# Patient Record
Sex: MALE | Race: WHITE | NOT HISPANIC OR LATINO | URBAN - METROPOLITAN AREA
[De-identification: names, ages, dates, MRNs, and addresses within clinical notes are randomized per-mention and may not be internally consistent; named-entity substitution may affect disease eponyms.]

---

## 2018-02-23 ENCOUNTER — EMERGENCY (EMERGENCY)
Facility: HOSPITAL | Age: 38
LOS: 0 days | Discharge: HOME | End: 2018-02-23
Attending: EMERGENCY MEDICINE

## 2018-02-23 VITALS — RESPIRATION RATE: 18 BRPM | HEART RATE: 95 BPM | OXYGEN SATURATION: 97 %

## 2018-02-23 VITALS
TEMPERATURE: 100 F | OXYGEN SATURATION: 98 % | HEART RATE: 109 BPM | DIASTOLIC BLOOD PRESSURE: 82 MMHG | RESPIRATION RATE: 22 BRPM | SYSTOLIC BLOOD PRESSURE: 139 MMHG

## 2018-02-23 DIAGNOSIS — J40 BRONCHITIS, NOT SPECIFIED AS ACUTE OR CHRONIC: ICD-10-CM

## 2018-02-23 DIAGNOSIS — J45.909 UNSPECIFIED ASTHMA, UNCOMPLICATED: ICD-10-CM

## 2018-02-23 DIAGNOSIS — J44.9 CHRONIC OBSTRUCTIVE PULMONARY DISEASE, UNSPECIFIED: ICD-10-CM

## 2018-02-23 DIAGNOSIS — Z79.899 OTHER LONG TERM (CURRENT) DRUG THERAPY: ICD-10-CM

## 2018-02-23 DIAGNOSIS — R05 COUGH: ICD-10-CM

## 2018-02-23 DIAGNOSIS — F17.200 NICOTINE DEPENDENCE, UNSPECIFIED, UNCOMPLICATED: ICD-10-CM

## 2018-02-23 RX ORDER — IPRATROPIUM/ALBUTEROL SULFATE 18-103MCG
3 AEROSOL WITH ADAPTER (GRAM) INHALATION ONCE
Qty: 0 | Refills: 0 | Status: COMPLETED | OUTPATIENT
Start: 2018-02-23 | End: 2018-02-23

## 2018-02-23 RX ORDER — AZITHROMYCIN 500 MG/1
1 TABLET, FILM COATED ORAL
Qty: 6 | Refills: 0
Start: 2018-02-23 | End: 2018-02-27

## 2018-02-23 RX ADMIN — Medication 3 MILLILITER(S): at 04:06

## 2018-02-23 RX ADMIN — Medication 50 MILLIGRAM(S): at 04:05

## 2018-02-23 NOTE — ED ADULT TRIAGE NOTE - CHIEF COMPLAINT QUOTE
cough, congestion w/ shortness of breath x 2-3 days. (+) rib pain cough, congestion w/ x 2-3 days. (+) rib pain

## 2018-02-23 NOTE — ED PROVIDER NOTE - MEDICAL DECISION MAKING DETAILS
pt sx c/w bronchitis; ekg reviewed, cxr negative; abx, prednisone, and cough meds given; pt to see pulmonologist next week

## 2018-02-23 NOTE — ED PROVIDER NOTE - NS ED ROS FT
Constitutional: (-) fever  Cardiovascular: (-) syncope  Respiratory: see hpi  Integumentary: (-) rash  Musculoskeltal: (-) myalgias  Neurological: (-) altered mental status

## 2018-02-23 NOTE — ED PROVIDER NOTE - PHYSICAL EXAMINATION
Vital Signs: I have reviewed the initial vital signs.  Constitutional: well-nourished, appears stated age, no acute distress  Heent: Perrl; eomi; tm clear; pharynx: mild erythema no exudates  Cardiovascular: regular rate, regular rhythm, well-perfused extremities  Respiratory: unlabored respiratory effort, + mild expiratory wheeze; no focal consolidation  Gastrointestinal: soft, non-tender abdomen, no pulsatile mass  Psychiatric: appropriate mood, appropriate affect

## 2018-02-23 NOTE — ED PROVIDER NOTE - ATTENDING CONTRIBUTION TO CARE
pt co congestion, productive cough, wheezing. pt in nad, speaking full sent no accessory use, b/l ro nchi and wheezing, moving air well, rrr, ab soft, nt, no edema/leg tenderness. will get cxr, nebs, steroids, abx.

## 2018-02-23 NOTE — ED ADULT NURSE NOTE - OBJECTIVE STATEMENT
patient states he has had cough for three days, now causing him discomfort in ribs. alert and in no respiratory distress, speaking in full sentences. denies any SOB

## 2018-02-23 NOTE — ED PROVIDER NOTE - OBJECTIVE STATEMENT
37 yold male to ED Pmhx Copd/asthma c/o cough, congestion, and cp with coughing forcefully x 2-3 days; pt currently under care of pulmonary DR. Calhoun currently on symbicort and proair; pt denies fever, sore throat, n/v, diaphoresis; pt started smoking recently again; + flu vaccination

## 2018-10-24 NOTE — ED ADULT NURSE NOTE - FALL HARM RISK TYPE OF ASSESSMENT
Admission
I will SWITCH the dose or number of times a day I take the medications listed below when I get home from the hospital:  None

## 2019-04-16 PROBLEM — J44.9 CHRONIC OBSTRUCTIVE PULMONARY DISEASE, UNSPECIFIED: Chronic | Status: ACTIVE | Noted: 2018-02-23

## 2019-04-16 PROBLEM — Z00.00 ENCOUNTER FOR PREVENTIVE HEALTH EXAMINATION: Status: ACTIVE | Noted: 2019-04-16

## 2019-04-16 PROBLEM — F17.200 NICOTINE DEPENDENCE, UNSPECIFIED, UNCOMPLICATED: Chronic | Status: ACTIVE | Noted: 2018-02-23

## 2019-04-22 ENCOUNTER — APPOINTMENT (OUTPATIENT)
Dept: NEUROSURGERY | Facility: CLINIC | Age: 39
End: 2019-04-22
Payer: COMMERCIAL

## 2019-04-22 VITALS — BODY MASS INDEX: 27.09 KG/M2 | HEIGHT: 72 IN | WEIGHT: 200 LBS

## 2019-04-22 DIAGNOSIS — Z86.39 PERSONAL HISTORY OF OTHER ENDOCRINE, NUTRITIONAL AND METABOLIC DISEASE: ICD-10-CM

## 2019-04-22 DIAGNOSIS — M47.816 SPONDYLOSIS W/OUT MYELOPATHY OR RADICULOPATHY, LUMBAR REGION: ICD-10-CM

## 2019-04-22 DIAGNOSIS — F17.200 NICOTINE DEPENDENCE, UNSPECIFIED, UNCOMPLICATED: ICD-10-CM

## 2019-04-22 PROCEDURE — 99203 OFFICE O/P NEW LOW 30 MIN: CPT

## 2019-04-22 RX ORDER — IBUPROFEN 600 MG/1
600 TABLET, FILM COATED ORAL TWICE DAILY
Qty: 60 | Refills: 0 | Status: ACTIVE | COMMUNITY
Start: 2019-04-22 | End: 1900-01-01

## 2019-04-22 RX ORDER — METHOCARBAMOL 750 MG/1
750 TABLET, FILM COATED ORAL EVERY 8 HOURS
Qty: 90 | Refills: 0 | Status: ACTIVE | COMMUNITY
Start: 2019-04-22 | End: 1900-01-01

## 2019-04-23 PROBLEM — F17.200 CURRENT EVERY DAY SMOKER: Status: ACTIVE | Noted: 2019-04-23

## 2019-04-23 PROBLEM — F17.200 CURRENT EVERY DAY SMOKER: Status: ACTIVE | Noted: 2019-04-22

## 2019-04-23 PROBLEM — Z86.39 HISTORY OF THYROID DISORDER: Status: RESOLVED | Noted: 2019-04-23 | Resolved: 2019-04-23

## 2019-04-23 PROBLEM — M47.816 LUMBAR SPONDYLOSIS: Status: ACTIVE | Noted: 2019-04-23

## 2019-04-24 NOTE — ASSESSMENT
[FreeTextEntry1] : We have had a thorough discussion regarding his findings. He is aware I would like an MRI of the lumbar spine for further evaluation. I will see him back once the MRI is completed. He is agreeable with our plan.

## 2019-04-24 NOTE — HISTORY OF PRESENT ILLNESS
[de-identified] : Mr. Vernon presents today for evaluation of severe lower back pain. Of late he notes intermittent pain in the right leg, mainly the thigh. He has trialed LESIs with Dr. Richardson which provided relief in the past. He had PT in the past, however, it aggravated his symptoms. Recently, he presents to the ER due to the severity of his pain. A CT scan of the lumbar spine from 4/16/19 showed mild spondylosis. As per the report there is a bulge at L4/5. His back pain worsens with sitting, walking, and bending. He is taking Ibuprofen and Robaxin as needed. On exam today he is alert and oriented. No distress. Gait steady. Motor exam is 5/5. Sensory intact. He has decreased ROM of the lumbar spine. Reflexes are equal.

## 2019-05-20 ENCOUNTER — APPOINTMENT (OUTPATIENT)
Dept: NEUROSURGERY | Facility: CLINIC | Age: 39
End: 2019-05-20

## 2019-11-05 ENCOUNTER — TRANSCRIPTION ENCOUNTER (OUTPATIENT)
Age: 39
End: 2019-11-05

## 2020-08-30 ENCOUNTER — EMERGENCY (EMERGENCY)
Facility: HOSPITAL | Age: 40
LOS: 0 days | Discharge: HOME | End: 2020-08-30
Attending: EMERGENCY MEDICINE | Admitting: EMERGENCY MEDICINE
Payer: COMMERCIAL

## 2020-08-30 VITALS
OXYGEN SATURATION: 99 % | DIASTOLIC BLOOD PRESSURE: 73 MMHG | TEMPERATURE: 98 F | HEART RATE: 73 BPM | SYSTOLIC BLOOD PRESSURE: 105 MMHG | RESPIRATION RATE: 18 BRPM

## 2020-08-30 VITALS
SYSTOLIC BLOOD PRESSURE: 138 MMHG | DIASTOLIC BLOOD PRESSURE: 83 MMHG | OXYGEN SATURATION: 99 % | TEMPERATURE: 98 F | RESPIRATION RATE: 18 BRPM | HEART RATE: 68 BPM

## 2020-08-30 DIAGNOSIS — F17.200 NICOTINE DEPENDENCE, UNSPECIFIED, UNCOMPLICATED: ICD-10-CM

## 2020-08-30 DIAGNOSIS — Z90.49 ACQUIRED ABSENCE OF OTHER SPECIFIED PARTS OF DIGESTIVE TRACT: ICD-10-CM

## 2020-08-30 DIAGNOSIS — K52.9 NONINFECTIVE GASTROENTERITIS AND COLITIS, UNSPECIFIED: ICD-10-CM

## 2020-08-30 DIAGNOSIS — K92.1 MELENA: ICD-10-CM

## 2020-08-30 DIAGNOSIS — J44.9 CHRONIC OBSTRUCTIVE PULMONARY DISEASE, UNSPECIFIED: ICD-10-CM

## 2020-08-30 DIAGNOSIS — E03.9 HYPOTHYROIDISM, UNSPECIFIED: ICD-10-CM

## 2020-08-30 LAB
ALBUMIN SERPL ELPH-MCNC: 4.9 G/DL — SIGNIFICANT CHANGE UP (ref 3.5–5.2)
ALP SERPL-CCNC: 72 U/L — SIGNIFICANT CHANGE UP (ref 30–115)
ALT FLD-CCNC: 24 U/L — SIGNIFICANT CHANGE UP (ref 0–41)
ANION GAP SERPL CALC-SCNC: 11 MMOL/L — SIGNIFICANT CHANGE UP (ref 7–14)
APTT BLD: 32.7 SEC — SIGNIFICANT CHANGE UP (ref 27–39.2)
AST SERPL-CCNC: 20 U/L — SIGNIFICANT CHANGE UP (ref 0–41)
BASOPHILS # BLD AUTO: 0.04 K/UL — SIGNIFICANT CHANGE UP (ref 0–0.2)
BASOPHILS NFR BLD AUTO: 0.6 % — SIGNIFICANT CHANGE UP (ref 0–1)
BILIRUB DIRECT SERPL-MCNC: <0.2 MG/DL — SIGNIFICANT CHANGE UP (ref 0–0.2)
BILIRUB INDIRECT FLD-MCNC: >0.2 MG/DL — SIGNIFICANT CHANGE UP (ref 0.2–1.2)
BILIRUB SERPL-MCNC: 0.4 MG/DL — SIGNIFICANT CHANGE UP (ref 0.2–1.2)
BLD GP AB SCN SERPL QL: SIGNIFICANT CHANGE UP
BUN SERPL-MCNC: 12 MG/DL — SIGNIFICANT CHANGE UP (ref 10–20)
CALCIUM SERPL-MCNC: 9.3 MG/DL — SIGNIFICANT CHANGE UP (ref 8.5–10.1)
CHLORIDE SERPL-SCNC: 105 MMOL/L — SIGNIFICANT CHANGE UP (ref 98–110)
CO2 SERPL-SCNC: 25 MMOL/L — SIGNIFICANT CHANGE UP (ref 17–32)
CREAT SERPL-MCNC: 1 MG/DL — SIGNIFICANT CHANGE UP (ref 0.7–1.5)
EOSINOPHIL # BLD AUTO: 0.22 K/UL — SIGNIFICANT CHANGE UP (ref 0–0.7)
EOSINOPHIL NFR BLD AUTO: 3.5 % — SIGNIFICANT CHANGE UP (ref 0–8)
GLUCOSE SERPL-MCNC: 91 MG/DL — SIGNIFICANT CHANGE UP (ref 70–99)
HCT VFR BLD CALC: 42.7 % — SIGNIFICANT CHANGE UP (ref 42–52)
HGB BLD-MCNC: 15 G/DL — SIGNIFICANT CHANGE UP (ref 14–18)
IMM GRANULOCYTES NFR BLD AUTO: 0.5 % — HIGH (ref 0.1–0.3)
INR BLD: 1.01 RATIO — SIGNIFICANT CHANGE UP (ref 0.65–1.3)
LACTATE SERPL-SCNC: 1.1 MMOL/L — SIGNIFICANT CHANGE UP (ref 0.7–2)
LIDOCAIN IGE QN: 17 U/L — SIGNIFICANT CHANGE UP (ref 7–60)
LYMPHOCYTES # BLD AUTO: 1.63 K/UL — SIGNIFICANT CHANGE UP (ref 1.2–3.4)
LYMPHOCYTES # BLD AUTO: 25.7 % — SIGNIFICANT CHANGE UP (ref 20.5–51.1)
MCHC RBC-ENTMCNC: 31.3 PG — HIGH (ref 27–31)
MCHC RBC-ENTMCNC: 35.1 G/DL — SIGNIFICANT CHANGE UP (ref 32–37)
MCV RBC AUTO: 89.1 FL — SIGNIFICANT CHANGE UP (ref 80–94)
MONOCYTES # BLD AUTO: 0.47 K/UL — SIGNIFICANT CHANGE UP (ref 0.1–0.6)
MONOCYTES NFR BLD AUTO: 7.4 % — SIGNIFICANT CHANGE UP (ref 1.7–9.3)
NEUTROPHILS # BLD AUTO: 3.95 K/UL — SIGNIFICANT CHANGE UP (ref 1.4–6.5)
NEUTROPHILS NFR BLD AUTO: 62.3 % — SIGNIFICANT CHANGE UP (ref 42.2–75.2)
NRBC # BLD: 0 /100 WBCS — SIGNIFICANT CHANGE UP (ref 0–0)
PLATELET # BLD AUTO: 164 K/UL — SIGNIFICANT CHANGE UP (ref 130–400)
POTASSIUM SERPL-MCNC: 4.2 MMOL/L — SIGNIFICANT CHANGE UP (ref 3.5–5)
POTASSIUM SERPL-SCNC: 4.2 MMOL/L — SIGNIFICANT CHANGE UP (ref 3.5–5)
PROT SERPL-MCNC: 7.2 G/DL — SIGNIFICANT CHANGE UP (ref 6–8)
PROTHROM AB SERPL-ACNC: 11.6 SEC — SIGNIFICANT CHANGE UP (ref 9.95–12.87)
RBC # BLD: 4.79 M/UL — SIGNIFICANT CHANGE UP (ref 4.7–6.1)
RBC # FLD: 12.8 % — SIGNIFICANT CHANGE UP (ref 11.5–14.5)
SODIUM SERPL-SCNC: 141 MMOL/L — SIGNIFICANT CHANGE UP (ref 135–146)
TROPONIN T SERPL-MCNC: <0.01 NG/ML — SIGNIFICANT CHANGE UP
WBC # BLD: 6.34 K/UL — SIGNIFICANT CHANGE UP (ref 4.8–10.8)
WBC # FLD AUTO: 6.34 K/UL — SIGNIFICANT CHANGE UP (ref 4.8–10.8)

## 2020-08-30 PROCEDURE — 71046 X-RAY EXAM CHEST 2 VIEWS: CPT | Mod: 26

## 2020-08-30 PROCEDURE — 99285 EMERGENCY DEPT VISIT HI MDM: CPT

## 2020-08-30 PROCEDURE — 74177 CT ABD & PELVIS W/CONTRAST: CPT | Mod: 26

## 2020-08-30 PROCEDURE — 93010 ELECTROCARDIOGRAM REPORT: CPT

## 2020-08-30 RX ORDER — METRONIDAZOLE 500 MG
1 TABLET ORAL
Qty: 30 | Refills: 0
Start: 2020-08-30 | End: 2020-09-08

## 2020-08-30 RX ORDER — MOXIFLOXACIN HYDROCHLORIDE TABLETS, 400 MG 400 MG/1
1 TABLET, FILM COATED ORAL
Qty: 20 | Refills: 0
Start: 2020-08-30 | End: 2020-09-08

## 2020-08-30 RX ORDER — SODIUM CHLORIDE 9 MG/ML
1000 INJECTION, SOLUTION INTRAVENOUS ONCE
Refills: 0 | Status: COMPLETED | OUTPATIENT
Start: 2020-08-30 | End: 2020-08-30

## 2020-08-30 RX ADMIN — SODIUM CHLORIDE 1000 MILLILITER(S): 9 INJECTION, SOLUTION INTRAVENOUS at 13:51

## 2020-08-30 NOTE — ED PROVIDER NOTE - PROVIDER TOKENS
FREE:[LAST:[YOUR GASTROENTEROLIGST],PHONE:[(   )    -],FAX:[(   )    -],ADDRESS:[YOUR GASTROENTEROLOGIST   DR. NASCIMENTO]]

## 2020-08-30 NOTE — ED ADULT TRIAGE NOTE - CHIEF COMPLAINT QUOTE
pt presenting with abdominal pain and bloody stools x 2 days.  pt reports he has had intermittent abd pain and bloody stool for 6 months total but the last two days has had worsening symptoms.

## 2020-08-30 NOTE — ED PROVIDER NOTE - CARE PROVIDER_API CALL
YOUR GASTROENTEROLIGST,   YOUR GASTROENTEROLOGIST   DR. NASCIMENTO  Phone: (   )    -  Fax: (   )    -  Follow Up Time:

## 2020-08-30 NOTE — ED PROVIDER NOTE - CLINICAL SUMMARY MEDICAL DECISION MAKING FREE TEXT BOX
pt presented with bloody stools, abd soft, no sig bleeding here. ct showing Descending colon wall thickening with surrounding inflammatory change consistent with colitis of uncertain etiology. will tx with cipro flagyl but aware MUST f/u with GI for further eval and colonscopy

## 2020-08-30 NOTE — ED PROVIDER NOTE - NSFOLLOWUPINSTRUCTIONS_ED_ALL_ED_FT
Colitis    Colitis is inflammation of the colon. Colitis may last a short time (acute) or it may last a long time (chronic).     CAUSES  This condition may be caused by:    Viruses.  Bacteria.  Reactions to medicine.  Certain autoimmune diseases, such as Crohn disease or ulcerative colitis.    SYMPTOMS  Symptoms of this condition include:    Diarrhea.  Passing bloody or tarry stool.  Pain.  Fever.  Vomiting.  Tiredness (fatigue).  Weight loss.  Bloating.  Sudden increase in abdominal pain.  Having fewer bowel movements than usual.    DIAGNOSIS  This condition is diagnosed with a stool test or a blood test. You may also have other tests, including X-rays, a CT scan, or a colonoscopy.    TREATMENT  Treatment may include:    Resting the bowel. This involves not eating or drinking for a period of time.  Fluids that are given through an IV tube.  Medicine for pain and diarrhea.  Antibiotic medicines.  Surgery.    HOME CARE INSTRUCTIONS  Eating and Drinking    Follow instructions from your health care provider about eating or drinking restrictions.  Drink enough fluid to keep your urine clear or pale yellow.  Work with a dietitian to determine which foods cause your condition to flare up.  Avoid foods that cause flare-ups.  Eat a well-balanced diet.    Medicines    Take over-the-counter and prescription medicines only as told by your health care provider.  If you were prescribed an antibiotic medicine, take it as told by your health care provider. Do not stop taking the antibiotic even if you start to feel better.    General Instructions    Keep all follow-up visits as told by your health care provider. This is important.    SEEK MEDICAL CARE IF:  Your symptoms do not go away.  You develop new symptoms.    SEEK IMMEDIATE MEDICAL CARE IF:  You have a fever that does not go away with treatment.  You develop chills.  You have extreme weakness, fainting, or dehydration.  You have repeated vomiting.  You develop severe pain in your abdomen.  You pass bloody or tarry stool.    ADDITIONAL NOTES AND INSTRUCTIONS    Please follow up with your Primary MD in 24-48 hr.  Seek immediate medical care for any new/worsening signs or symptoms.

## 2020-08-30 NOTE — ED PROVIDER NOTE - PHYSICAL EXAMINATION
Physical Exam    Vital Signs: I have reviewed the initial vital signs.  Constitutional: well-nourished, appears stated age, no acute distress  Eyes: Conjunctiva pink, Sclera clear  Cardiovascular: S1 and S2, regular rate, regular rhythm, well-perfused extremities, radial pulses equal and 2+ b/l.   Respiratory: unlabored respiratory effort, clear to auscultation bilaterally no wheezing, rales and rhonchi. pt is speaking full sentences. no accessory muscle use.   Gastrointestinal: soft, non-tender, nondistended abdomen, no pulsatile mass, normal bowl sounds, no rebound, no guarding, negative psoas, negative obturator, negative murphys. no cva tenderness.   Genitourinary: rectal exam negative for hemorrhoids of anal fissures. no palpable masses. no bright right blood per rectum. no stool obtained during rectal exam. however pt brought as stool sample, and there is gross blood in the stool.   Musculoskeletal: supple neck, no lower extremity edema, no calf tenderness  Neurologic: awake, alert  Psychiatric: appropriate mood, appropriate affect

## 2020-08-30 NOTE — ED PROVIDER NOTE - PROGRESS NOTE DETAILS
discussed radiology and lab results with pt. discussed dx of colitis. rx abx. advised to f/u with gi. advised of return precautions such as fever, chills, syncope, abdominal pain, increase bloody stool. agreeable to dc.

## 2020-08-30 NOTE — ED PROVIDER NOTE - PATIENT PORTAL LINK FT
You can access the FollowMyHealth Patient Portal offered by [facility] by registering at the following website: [email or web address]. By joining [employer]’s FollowMyHealth portal, you will also be able to view your health information using other applications (apps) compatible with our system.

## 2020-08-30 NOTE — ED PROVIDER NOTE - OBJECTIVE STATEMENT
39 y/o male with a PMH of reactive airway disease (follows dr. boykin), hypothyroidism, and glaucoma presents to the ED for evaluation of burning epigastric pain radiating to the chest, and blood in the stool x 2 days. pt reports having about 10-12 episodes of bloody stools the past two days. pt reports he has had intermittent bloody stools about 1 x a week for six months. Pt was seen by dr. boss last year for abdominal pain, endoscopy and colonscopy inconclusive. Pt reports eating spicy foods daily. pt reports vaping. pt has a hx of appendectomy. pt denies use of NSAIDS, n/v/d/c, blood in the urine, easy bruising, sob, back pain, arm pain, neck pain, jaw pain, fever chills, fhx of UC or crohns, dizziness, recent travel, new foods in the diet, recent vaccinations, recent trauma, recent surgeries, use of hormones or steroids, recent sick contacts, hx of mi or blood clot, rashes, or headaches.

## 2020-08-30 NOTE — ED PROVIDER NOTE - NS ED ROS FT
CONST: No fever, chills or bodyaches  EYES: No pain, redness, drainage or visual changes.  ENT: No ear pain or discharge, nasal discharge or congestion. No sore throat  CARD: No chest pain, palpitations  RESP: No SOB, cough, hemoptysis. No hx of asthma or COPD  GI: (+) epigastric abdominal pain. No N/V/D  : (+) blood in the stool. No urinary symptoms  MS: No joint pain, back pain or extremity pain/injury  SKIN: No rashes  NEURO: No headache, dizziness, paresthesias or LOC

## 2020-08-30 NOTE — ED PROVIDER NOTE - ATTENDING CONTRIBUTION TO CARE
41 y/o male with a PMH of reactive airway disease (follows dr. boykin), hypothyroidism, and glaucoma here for eval of of burning epigastric pain radiating to the chest, and blood in the stool x 2 days. pt reports having about 10-12 episodes of bloody stools the past two days. blood is bright red, mixed with stool and only when he goes to the ab[facility]room. pt has in the past had  intermittent bloody stools about 1 x a week for six months. Pt was seen by dr. boss last year for abdominal pain, endoscopy and colonscopy inconclusive. Pt reports eating spicy foods daily. pt denies use of NSAIDS, n/v.   CON: ao x 3, HENMT: clear oropharynx,  neck supple,  CV: rrr, equal pulses b/l, RESP: cta b/l, GI:  soft, nontender, no rebound, no guarding, SKIN: no rash, MSK: no deformities, NEURO: no gross motor or sensory deficit Psychiatric: appropriate mood, appropriate affect MARY as per PA unremarkable

## 2020-08-30 NOTE — ED ADULT NURSE NOTE - OBJECTIVE STATEMENT
Pt with C/O abdomen pain with blood in the stool for 2 days with blood in the stool and spot ing  blood  for 2 month ,

## 2021-03-25 ENCOUNTER — EMERGENCY (EMERGENCY)
Facility: HOSPITAL | Age: 41
LOS: 0 days | Discharge: HOME | End: 2021-03-25
Attending: EMERGENCY MEDICINE | Admitting: EMERGENCY MEDICINE
Payer: COMMERCIAL

## 2021-03-25 VITALS
HEART RATE: 85 BPM | SYSTOLIC BLOOD PRESSURE: 129 MMHG | WEIGHT: 214.95 LBS | TEMPERATURE: 98 F | OXYGEN SATURATION: 98 % | RESPIRATION RATE: 19 BRPM | DIASTOLIC BLOOD PRESSURE: 92 MMHG

## 2021-03-25 DIAGNOSIS — R07.9 CHEST PAIN, UNSPECIFIED: ICD-10-CM

## 2021-03-25 DIAGNOSIS — Z20.822 CONTACT WITH AND (SUSPECTED) EXPOSURE TO COVID-19: ICD-10-CM

## 2021-03-25 DIAGNOSIS — R07.2 PRECORDIAL PAIN: ICD-10-CM

## 2021-03-25 DIAGNOSIS — F17.200 NICOTINE DEPENDENCE, UNSPECIFIED, UNCOMPLICATED: ICD-10-CM

## 2021-03-25 DIAGNOSIS — J44.9 CHRONIC OBSTRUCTIVE PULMONARY DISEASE, UNSPECIFIED: ICD-10-CM

## 2021-03-25 DIAGNOSIS — Z79.51 LONG TERM (CURRENT) USE OF INHALED STEROIDS: ICD-10-CM

## 2021-03-25 DIAGNOSIS — Z79.52 LONG TERM (CURRENT) USE OF SYSTEMIC STEROIDS: ICD-10-CM

## 2021-03-25 LAB
ALBUMIN SERPL ELPH-MCNC: 4.7 G/DL — SIGNIFICANT CHANGE UP (ref 3.5–5.2)
ALP SERPL-CCNC: 85 U/L — SIGNIFICANT CHANGE UP (ref 30–115)
ALT FLD-CCNC: 47 U/L — HIGH (ref 0–41)
ANION GAP SERPL CALC-SCNC: 13 MMOL/L — SIGNIFICANT CHANGE UP (ref 7–14)
AST SERPL-CCNC: 30 U/L — SIGNIFICANT CHANGE UP (ref 0–41)
BASOPHILS # BLD AUTO: 0.05 K/UL — SIGNIFICANT CHANGE UP (ref 0–0.2)
BASOPHILS NFR BLD AUTO: 0.8 % — SIGNIFICANT CHANGE UP (ref 0–1)
BILIRUB SERPL-MCNC: 0.3 MG/DL — SIGNIFICANT CHANGE UP (ref 0.2–1.2)
BUN SERPL-MCNC: 17 MG/DL — SIGNIFICANT CHANGE UP (ref 10–20)
CALCIUM SERPL-MCNC: 9.9 MG/DL — SIGNIFICANT CHANGE UP (ref 8.5–10.1)
CHLORIDE SERPL-SCNC: 101 MMOL/L — SIGNIFICANT CHANGE UP (ref 98–110)
CO2 SERPL-SCNC: 25 MMOL/L — SIGNIFICANT CHANGE UP (ref 17–32)
CREAT SERPL-MCNC: 1 MG/DL — SIGNIFICANT CHANGE UP (ref 0.7–1.5)
EOSINOPHIL # BLD AUTO: 0.23 K/UL — SIGNIFICANT CHANGE UP (ref 0–0.7)
EOSINOPHIL NFR BLD AUTO: 3.5 % — SIGNIFICANT CHANGE UP (ref 0–8)
GLUCOSE SERPL-MCNC: 77 MG/DL — SIGNIFICANT CHANGE UP (ref 70–99)
HCT VFR BLD CALC: 46.4 % — SIGNIFICANT CHANGE UP (ref 42–52)
HGB BLD-MCNC: 16 G/DL — SIGNIFICANT CHANGE UP (ref 14–18)
IMM GRANULOCYTES NFR BLD AUTO: 1.2 % — HIGH (ref 0.1–0.3)
LIDOCAIN IGE QN: 18 U/L — SIGNIFICANT CHANGE UP (ref 7–60)
LYMPHOCYTES # BLD AUTO: 1.82 K/UL — SIGNIFICANT CHANGE UP (ref 1.2–3.4)
LYMPHOCYTES # BLD AUTO: 27.7 % — SIGNIFICANT CHANGE UP (ref 20.5–51.1)
MAGNESIUM SERPL-MCNC: 2 MG/DL — SIGNIFICANT CHANGE UP (ref 1.8–2.4)
MCHC RBC-ENTMCNC: 29.5 PG — SIGNIFICANT CHANGE UP (ref 27–31)
MCHC RBC-ENTMCNC: 34.5 G/DL — SIGNIFICANT CHANGE UP (ref 32–37)
MCV RBC AUTO: 85.6 FL — SIGNIFICANT CHANGE UP (ref 80–94)
MONOCYTES # BLD AUTO: 0.52 K/UL — SIGNIFICANT CHANGE UP (ref 0.1–0.6)
MONOCYTES NFR BLD AUTO: 7.9 % — SIGNIFICANT CHANGE UP (ref 1.7–9.3)
NEUTROPHILS # BLD AUTO: 3.86 K/UL — SIGNIFICANT CHANGE UP (ref 1.4–6.5)
NEUTROPHILS NFR BLD AUTO: 58.9 % — SIGNIFICANT CHANGE UP (ref 42.2–75.2)
NRBC # BLD: 0 /100 WBCS — SIGNIFICANT CHANGE UP (ref 0–0)
NT-PROBNP SERPL-SCNC: 15 PG/ML — SIGNIFICANT CHANGE UP (ref 0–300)
PLATELET # BLD AUTO: 198 K/UL — SIGNIFICANT CHANGE UP (ref 130–400)
POTASSIUM SERPL-MCNC: 4.3 MMOL/L — SIGNIFICANT CHANGE UP (ref 3.5–5)
POTASSIUM SERPL-SCNC: 4.3 MMOL/L — SIGNIFICANT CHANGE UP (ref 3.5–5)
PROT SERPL-MCNC: 7.6 G/DL — SIGNIFICANT CHANGE UP (ref 6–8)
RBC # BLD: 5.42 M/UL — SIGNIFICANT CHANGE UP (ref 4.7–6.1)
RBC # FLD: 12.8 % — SIGNIFICANT CHANGE UP (ref 11.5–14.5)
SARS-COV-2 RNA SPEC QL NAA+PROBE: SIGNIFICANT CHANGE UP
SODIUM SERPL-SCNC: 139 MMOL/L — SIGNIFICANT CHANGE UP (ref 135–146)
TROPONIN T SERPL-MCNC: <0.01 NG/ML — SIGNIFICANT CHANGE UP
TROPONIN T SERPL-MCNC: <0.01 NG/ML — SIGNIFICANT CHANGE UP
WBC # BLD: 6.56 K/UL — SIGNIFICANT CHANGE UP (ref 4.8–10.8)
WBC # FLD AUTO: 6.56 K/UL — SIGNIFICANT CHANGE UP (ref 4.8–10.8)

## 2021-03-25 PROCEDURE — 99220: CPT

## 2021-03-25 PROCEDURE — 93010 ELECTROCARDIOGRAM REPORT: CPT

## 2021-03-25 PROCEDURE — 71046 X-RAY EXAM CHEST 2 VIEWS: CPT | Mod: 26

## 2021-03-25 RX ORDER — KETOROLAC TROMETHAMINE 30 MG/ML
30 SYRINGE (ML) INJECTION ONCE
Refills: 0 | Status: DISCONTINUED | OUTPATIENT
Start: 2021-03-25 | End: 2021-03-25

## 2021-03-25 RX ORDER — METHOCARBAMOL 500 MG/1
1500 TABLET, FILM COATED ORAL ONCE
Refills: 0 | Status: COMPLETED | OUTPATIENT
Start: 2021-03-25 | End: 2021-03-25

## 2021-03-25 RX ORDER — ASPIRIN/CALCIUM CARB/MAGNESIUM 324 MG
162 TABLET ORAL ONCE
Refills: 0 | Status: COMPLETED | OUTPATIENT
Start: 2021-03-25 | End: 2021-03-25

## 2021-03-25 RX ADMIN — Medication 162 MILLIGRAM(S): at 15:37

## 2021-03-25 RX ADMIN — Medication 30 MILLIGRAM(S): at 21:21

## 2021-03-25 RX ADMIN — METHOCARBAMOL 1500 MILLIGRAM(S): 500 TABLET, FILM COATED ORAL at 21:20

## 2021-03-25 NOTE — ED ADULT NURSE REASSESSMENT NOTE - NS ED NURSE REASSESS COMMENT FT1
received pt in bed A and OX 3  in NAD resting comfortably, endorses no complaints, comfort measures provided.

## 2021-03-25 NOTE — ED PROVIDER NOTE - OBJECTIVE STATEMENT
VOMITING/ABDOMINAL PAIN/NAUSEA/decreased appetite
Pt is a 39y/o male with a pmhx of HLD non-complaint with meds presents today for eval of midsternal chest discomfort that has been intermittent for approx 6 months with no aggravating/alleviating factors. Pt sts pain has been radiating to left upper back x 3 days which is what prompted visit. Pt denies fever, chills, weakness, numbness, SOB, Leg swelling, n/v/d.  Pt's father had CABG at 47y/o. Pt is followed by Dr. Sandy and had pharm nuc in October of this year, followed by a stress echo approx 2 weeks ago.

## 2021-03-25 NOTE — ED PROVIDER NOTE - PHYSICAL EXAMINATION
VITAL SIGNS: I have reviewed nursing notes and confirm.  CONSTITUTIONAL: Well-developed; well-nourished; in no acute distress.   SKIN: skin exam is warm and dry, no acute rash.    HEAD: Normocephalic; atraumatic.  EYES: conjunctiva and sclera clear.  ENT: No nasal discharge; airway clear.  NECK: Supple; non tender.  CARD: S1, S2 normal; no murmurs, gallops, or rubs. Regular rate and rhythm.   RESP: No wheezes, rales or rhonchi.  ABD: Normal bowel sounds; soft; non-distended; non-tender  EXT: Normal ROM.  No clubbing, cyanosis or edema.   LYMPH: No acute cervical adenopathy.  NEURO: Alert, oriented, grossly unremarkable  PSYCH: anxious

## 2021-03-25 NOTE — ED CDU PROVIDER INITIAL DAY NOTE - PROGRESS NOTE DETAILS
s/o note:  received s/o from DAGOBERTO Cee evaluated patient at bedside. c/o constant left sided chest pain for few months. Denies any palpitations and sob/ dizziness/lightheadedness currently. PE: NAD. VSS. s1 s2 no murmur, + reproducible chest wall tenderness; abd soft/ND/NT/ no swelling and tenderness to extremities. pulses are equal to all extremities. Neuro exam grossly unremarkable. speaking coherently and clear speech. pending CCTA in am. give toradol/robaxin for chest pain

## 2021-03-25 NOTE — ED ADULT NURSE REASSESSMENT NOTE - NS ED NURSE REASSESS COMMENT FT1
Pt given robaxin and torodol for pain, reports pain relief, connected to cardiac monitor, vss. Pt updated on poc

## 2021-03-25 NOTE — ED PROVIDER NOTE - PROGRESS NOTE DETAILS
Attending Note: 39 y/o M with hx of lung nodules and Hashimotos disease, presents to ED c/o substernal CP that is radiating to the LT side. No fevers or sick contacts. No falls or trauma. No n/v/d. Pt had a negative pharmnuc stress test recently and follows up with Dr. Chery.  PE: AVSS, exam as noted, CTAB, RRR, abdomen soft NTND, (+) bowel sounds,  neuro nonfocal spoke with pt at length and feels miore comfortable with obtaining CCTA in ED tomorrow instead of as an outpatient with Dr. Sandy

## 2021-03-25 NOTE — ED ADULT NURSE NOTE - NSIMPLEMENTINTERV_GEN_ALL_ED
Implemented All Universal Safety Interventions:  [place] to call system. Call bell, personal items and telephone within reach. Instruct patient to call for assistance. Room bathroom lighting operational. Non-slip footwear when patient is off stretcher. Physically safe environment: no spills, clutter or unnecessary equipment. Stretcher in lowest position, wheels locked, appropriate side rails in place.

## 2021-03-25 NOTE — ED PROVIDER NOTE - NS ED ROS FT
Eyes:  No visual changes, eye pain or discharge.  ENMT:  No hearing changes, pain, discharge or infections. No neck pain or stiffness.  Cardiac:  + CP No  SOB or edema. No chest pain with exertion.  Respiratory:  No cough or respiratory distress. No hemoptysis. No history of asthma or RAD.  GI:  No nausea, vomiting, diarrhea or abdominal pain.  :  No dysuria, frequency or burning.  MS:  No myalgia, muscle weakness, joint pain or back pain.  Neuro:  No headache or weakness.  No LOC.  Skin:  No skin rash.   Endocrine: No history of thyroid disease or diabetes.  Except as documented in the HPI,  all other systems are negative.

## 2021-03-26 VITALS
OXYGEN SATURATION: 98 % | DIASTOLIC BLOOD PRESSURE: 81 MMHG | SYSTOLIC BLOOD PRESSURE: 109 MMHG | RESPIRATION RATE: 16 BRPM | HEART RATE: 70 BPM | TEMPERATURE: 98 F

## 2021-03-26 PROCEDURE — 75574 CT ANGIO HRT W/3D IMAGE: CPT | Mod: 26

## 2021-03-26 PROCEDURE — 99217: CPT

## 2021-03-26 RX ORDER — METOPROLOL TARTRATE 50 MG
100 TABLET ORAL ONCE
Refills: 0 | Status: COMPLETED | OUTPATIENT
Start: 2021-03-26 | End: 2021-03-26

## 2021-03-26 RX ORDER — ALPRAZOLAM 0.25 MG
0.5 TABLET ORAL ONCE
Refills: 0 | Status: DISCONTINUED | OUTPATIENT
Start: 2021-03-26 | End: 2021-03-26

## 2021-03-26 RX ORDER — METOPROLOL TARTRATE 50 MG
50 TABLET ORAL ONCE
Refills: 0 | Status: COMPLETED | OUTPATIENT
Start: 2021-03-26 | End: 2021-03-26

## 2021-03-26 RX ORDER — NITROGLYCERIN 6.5 MG
0.4 CAPSULE, EXTENDED RELEASE ORAL ONCE
Refills: 0 | Status: DISCONTINUED | OUTPATIENT
Start: 2021-03-26 | End: 2021-03-25

## 2021-03-26 RX ADMIN — Medication 30 MILLIGRAM(S): at 03:46

## 2021-03-26 RX ADMIN — Medication 0.5 MILLIGRAM(S): at 08:02

## 2021-03-26 RX ADMIN — Medication 100 MILLIGRAM(S): at 07:34

## 2021-03-26 RX ADMIN — Medication 50 MILLIGRAM(S): at 06:00

## 2021-03-26 NOTE — ED CDU PROVIDER DISPOSITION NOTE - PATIENT PORTAL LINK FT
You can access the FollowMyHealth Patient Portal offered by [facility] by registering at the following website: [email or web address]. By joining [employer]’s FollowMyHealth portal, you will also be able to view your health information using other applications (apps) compatible with our system.

## 2021-03-26 NOTE — ED CDU PROVIDER DISPOSITION NOTE - ATTENDING CONTRIBUTION TO CARE
41yo man h/o HLD, FH CAD was placed in CDU for workup of chest pain after an unremarkable ED workup. Pt follows with Dr Sandy, had normal nuclear stress test in 10/2020 and a recent stress echo (no results yet). VS, exam as noted, pt very anxious. He was monitored without incident, repeat EKG and enzymes unchanged. CCTA was limited by motion artifact but calcium score was 0 and arteries grossly patent. Given that pt has had extensive functional workup previously and has close followup, will d/c home to see Dr Sandy in the office as scheduled.

## 2021-03-26 NOTE — ED CDU PROVIDER DISPOSITION NOTE - CLINICAL COURSE
39yo man h/o HLD, FH CAD was placed in CDU for workup of chest pain after an unremarkable ED workup. Pt follows with Dr Sandy, had normal nuclear stress test in 10/2020 and a recent stress echo (no results yet). VS, exam as noted, pt very anxious. He was monitored without incident, repeat EKG and enzymes unchanged. CCTA was limited by motion artifact but calcium score was 0 and arteries grossly patent. Given that pt has had extensive functional workup previously and has close followup, will d/c home to see Dr Sandy in the office as scheduled.

## 2021-03-26 NOTE — ED ADULT NURSE REASSESSMENT NOTE - NS ED NURSE REASSESS COMMENT FT1
Pt A+Ox4, walks with a steady gait. Denies chest pain. NSR noted on monitor. LAC 18g in place and intact. Awat[place] CCTA.

## 2021-03-26 NOTE — ED ADULT NURSE REASSESSMENT NOTE - NS ED NURSE REASSESS COMMENT FT1
THE [facility] MULTIPLE SCLEROSIS CLINIC  NEW PATIENT EVALUATION/CONSULTATION    Referral source:   Bhavik  [address] [facility] [address]            PRINCIPAL NEUROLOGIC DIAGNOSIS: Multiple Sclerosis PP    DISEASE SUMMARY  Date of onset:  (age 50-51)  Date of diagnosis of MS:   Disease course at onset: Relapsing Remitting  Current disease course: Relapsing Remitting  Previous disease therapies: None  Current disease therapy: None  Most recent MRI brain: 19  Most recent MRI cervical spine:   CSF:         HISTORY OF ILLNESS:    An opinion on this 66 year old right handed genetic female  was requested by Dr. Jolie Gutierres for Management of MS. The patient was accompanied by sister.     Karen Angel has  no significant PMH except for a Hx of Migraines, she carries the diagnosis of MS since . The Hx. Is provided mostly by her sister since the patient has severe cognitive impairment but she does remember some events and facts. The sister states they did not have a lot of contact before she (the patient) moved in with their Mom in [facility]) in , after their father . At that time she seemed withdrawn, her sister says 'something was not right'. Karen worked [employer] at [employer] for a while but she later quit and could not find a job, unclear why. Her sister saw her occasionally over the next 8 years and noticed things were getting worse such as her balance and memory, she did not have insurance at the time so did not get much medical care. Aproximately 10 years ago her sister who lives in [place] received a call that Karen was at the [facility].  Apparently the policed pulled her over for driving erratically, she did not remember that ther mother was in [place] and was looking for the nursing home. The doctor at [facility] told her she needed to come pick her up but when her sister said something was wrong, they did a work up and she was  report given to oncoming RN for continuation of care. diagnosed with MS, she was at [facility] for over 2 months, she was discharged to a nursing home for one year, then an assisted living facility in [facility] and soon after she was diagnosed with Parkinson's disease. She has moved to different facilities in the past 8 years and has gotten worse, especially her tremors, she started using a walker 2 years ago due to worsening balance. She is currently at an assisted living in [facility] and has been there for 2 years. Her mother passed away 8 years ago. She had a healthy childhood and adolescence, no problems as an adult, was a CPA.     More recently she went to [facility] clinic of Neurology where they said it was not Parkinson's and referred her to [facility], she saw Dr. Tucker then transferred to Dr. Damian who saw her 9-11-19 and prescribed Primidone for the head and hand tremor and referred her to her MS clinic.      Current Symptoms:  1. Cognitive impairment  2. Cerebellar Outflow Tremor (hand and head)  3. Balance issues        PAST HISTORY:  Past Medical History:   Diagnosis Date     Abnormal brain MRI 9/30/2019    Narrative & Impression   MRI BRAIN WITHOUT AND WITH CONTRAST  9/30/2019 11:17 AM    HISTORY:  Neuro deficit(s), subacute. Tremor. Multiple sclerosis (H).   TECHNIQUE:  Multiplanar, multisequence MRI of the brain without and with 7 mL Gadavist.   COMPARISON: Outside MRI head 10/22/2018.   FINDINGS: There is no restricted diffusion to suggest acute infarct. Confluent areas of T2/T2 FLAIR signal abno     Dementia (H)      Migraine      Multiple sclerosis, primary progressive (H)        Past Surgical History:   Procedure Laterality Date     CHOLECYSTECTOMY       HYSTERECTOMY                Current Outpatient Prescriptions:  Current Outpatient Medications   Medication     citalopram (CELEXA) 20 MG tablet     clonazePAM (KLONOPIN) 0.5 MG tablet     guaiFENesin (MUCINEX) 600 MG 12 hr tablet     primidone (MYSOLINE) 50 MG tablet     traZODone (DESYREL) 100 MG tablet      vitamin D3 (CHOLECALCIFEROL) 2000 units (50 mcg) tablet     No current facility-administered medications for this visit.           ALLERGIES     No Known Allergies      Social History    Social History     Socioeconomic History     Marital status: Single     Spouse name: Not on file     Number of children: Not on file     Years of education: Not on file     Highest education level: Not on file   Occupational History     Occupation: unemployed   Social Needs     Financial resource strain: Not on file     Food insecurity:     Worry: Not on file     Inability: Not on file     Transportation needs:     Medical: Not on file     Non-medical: Not on file   Tobacco Use     Smoking status: Former Smoker     Packs/day: 1.00     Years: 20.00     Pack years: 20.00     Smokeless tobacco: Never Used   Substance and Sexual Activity     Alcohol use: Not Currently     Drug use: Not on file     Sexual activity: Not on file   Lifestyle     Physical activity:     Days per week: Not on file     Minutes per session: Not on file     Stress: Not on file   Relationships     Social connections:     Talks on phone: Not on file     Gets together: Not on file     Attends [employer] service: Not on file     Active member of club or organization: Not on file     Attends meetings of clubs or organizations: Not on file     Relationship status: Not on file     Intimate partner violence:     Fear of current or ex partner: Not on file     Emotionally abused: Not on file     Physically abused: Not on file     Forced sexual activity: Not on file   Other Topics Concern     Not on file   Social History Narrative    Single. Lives in [place] at [facility] an assisted living facility sine 2014.     Prior to her hospitalization in 2008 she worked as a  that afterward at [employer].        She attended the [employer] for 2 years.         FAMILY HISTORY     Family History   Problem Relation Age of Onset     Myocardial Infarction  Mother      Hypertension Mother      Heart Failure Father          REVIEW OF SYSTEMS:    Comprehensive review of systems otherwise was negative, including constitutional, head and neck, cardiovascular, pulmonary, gastrointestinal, endocrine, urologic, reproductive, rheumatic, hematologic, immunologic, dermatologic, and psychiatric.    Nutritional concerns: None  Driving issues: None   Safety concerns regarding living situations and safety at home: None  Risk of falls: None  Pain: None    Neurologic Exam:  Mental status: Alert and oriented to person, but not place ([place]), time (2009), or situation. Follows commands. Recent and remote memory not intact. Attention span and concentration intact. Fund of knowledge intact to current events. Able to recall 0/3 objects (1/3 with cue). Able to recall current president and unable to recall past presidents. Unable to spell WORLD backwards ([place]O). Able to name an object and describe its function (pen).  Speech: Fluent, intact comprehension and articulation  CN: visual fields intact in all fields, EOMIB,  no nystagmus, normal saccades, PERRL, facial sensation intact, facial movement symmetric,facial expression normal, hearing grossly intact to conversation, tongue protrudes midline   Motor: Moves all extremities equally against gravity without difficulty, increased axial tone observed with distraction and paratonia observed in BUE/BLE, posturing observed in L hand  Ideomotor apraxia on LUE, right/left confusion, double simultaneous extinction not intact  Finger taps normal b/l  Slight slowing of hand opening and closing b/l  Deborah normal b/l  Leg agility/toe taps normal b/l  Body bradykinesia normal  Reflexes (R/L): 3/2  in biceps, brachioradialis, triceps, patellars, achilles; no clonus  Sensation: intact to light touch throughout   Coordination: grossly intact with FTN, HTS; unable to perform figure 8 with both feet  Gait: unable to rise unassisted from a seated  "position requires arms to push self up, no en bloc turns, no ataxia, stopped posture, uses roller walker, decreased ADF on R     Tremor evaluation -   Head: persistent head titubation with \"no-no\" tremor  Face: none  Voice: slight voice tremor with \"Eee\" and \"Ahhh\"  Posture: proximal moderate amplitude tremor best seen in the batwing position   Kinetic: intention tremor when reaching target  Overflow tremor observed from head to BUE  No rest tremor observed   Lower limb (R; L): none    REVIEW OF IMAGING STUDIES:    I personally reviewed the following images:    MRI brain images incomplete, though, of what can be observed there is diffuse white matter hyperintensity that appear similar to confluent white matter changes suggestive of SPMS    ASSESSMENT/PLAN:    Increase primidone to 1.5 tablets a day for tremor  We will start Namenda but would establish a BICAMS baseline first.    She will be referred to see Ashley for a CARE visit to obtain BICAMS scores then start Namenda 10 mg po BID (5 at bedtime x 2 weeks and increase by 5 mg every 2 weeks until reaching goal),  repeat BICAMS ONLY 3 months after starting NAMENDA (Senna).    Alpha lipoc acid 600-900 mg every day.      Finally I will follow the patient up in 4 month(s) as long as the patient is doing well. I instructed the patient to call or mychart my office with any concerns or questions.    I spent 90 minutes in this visit, with >50% direct patient time spent counseling about prognosis, treatment options, and coordination of care.     My recommendations will be communicated back to the patient's physician(s) by mail.  Follow-up is expected to be with me.      Irais Wilson MD  Chief, Multiple Sclerosis Division  Department of Neurology  [facility]      (Chart documentation was completed in part with Dragon voice-recognition software. Even though reviewed, some grammatical, spelling, and word errors may remain.)     "

## 2021-03-26 NOTE — ED CDU PROVIDER DISPOSITION NOTE - PROVIDER TOKENS
PROVIDER:[TOKEN:[[id number]],FOLLOWUP:[4-6 Days]],PROVIDER:[TOKEN:[[id number]],FOLLOWUP:[7-10 Days]],FREE:[LAST:[Your primary care provider],PHONE:[(   )    -],FAX:[(   )    -],FOLLOWUP:[1-3 Days]]

## 2021-03-26 NOTE — ED CDU PROVIDER DISPOSITION NOTE - CARE PROVIDER_API CALL
Wily Sandy)  Cardiovascular Disease; Interventional Cardiology  [address] [place] [address]. [address]  [place] [address]  Phone: [phone]  Fax: [phone]  Follow Up Time: 4-6 Days    Ehsan Reynoso  CRITICAL CARE MEDICINE  [address] [place] [address]  Phone: [phone]  Fax: [phone]  Follow Up Time: 7-10 Days    Your primary care provider,   Phone: (   )    -  Fax: (   )    -  Follow Up Time: 1-3 Days

## 2021-04-13 NOTE — ED CDU PROVIDER SUBSEQUENT DAY NOTE - NS ED ROS FT
Review of Systems:  	•	CONSTITUTIONAL - no fever, no diaphoresis, no chills  	•	SKIN - no rash  	•	HEMATOLOGIC - no bleeding, no bruising  	•	EYES - no eye pain, no blurry vision  	•	ENT - no congestion  	•	RESPIRATORY - no shortness of breath, no cough  	•	CARDIAC - + chest pain, no palpitations  	•	GI - no abd pain, no nausea, no vomiting, no diarrhea, no constipation  	•	GENITO-URINARY - no dysuria; no hematuria, no increased urinary frequency  	•	MUSCULOSKELETAL - no joint paint, no swelling, no redness  	•	NEUROLOGIC - no weakness, no headache, no paresthesias, no LOC  	•	PSYCH - non suicidal, non homicidal, no hallucination, no depression  	All other ROS are negative except as documented in HPI.

## 2021-06-23 ENCOUNTER — APPOINTMENT (OUTPATIENT)
Age: 41
End: 2021-06-23
Payer: COMMERCIAL

## 2021-06-23 ENCOUNTER — APPOINTMENT (OUTPATIENT)
Dept: PULMONOLOGY | Facility: CLINIC | Age: 41
End: 2021-06-23
Payer: COMMERCIAL

## 2021-06-23 VITALS
SYSTOLIC BLOOD PRESSURE: 136 MMHG | HEART RATE: 90 BPM | OXYGEN SATURATION: 97 % | DIASTOLIC BLOOD PRESSURE: 82 MMHG | HEIGHT: 72 IN | BODY MASS INDEX: 29.66 KG/M2 | RESPIRATION RATE: 14 BRPM | WEIGHT: 219 LBS

## 2021-06-23 PROCEDURE — 94727 GAS DIL/WSHOT DETER LNG VOL: CPT

## 2021-06-23 PROCEDURE — 99072 ADDL SUPL MATRL&STAF TM PHE: CPT

## 2021-06-23 PROCEDURE — 94010 BREATHING CAPACITY TEST: CPT

## 2021-06-23 PROCEDURE — 94729 DIFFUSING CAPACITY: CPT

## 2021-06-23 PROCEDURE — 99213 OFFICE O/P EST LOW 20 MIN: CPT | Mod: 25

## 2021-06-23 RX ORDER — BUDESONIDE AND FORMOTEROL FUMARATE DIHYDRATE 160; 4.5 UG/1; UG/1
160-4.5 AEROSOL RESPIRATORY (INHALATION) TWICE DAILY
Qty: 1 | Refills: 5 | Status: ACTIVE | COMMUNITY
Start: 2021-06-23 | End: 1900-01-01

## 2021-06-23 NOTE — HISTORY OF PRESENT ILLNESS
[Doing Well] : doing well [None] : None [Adherent] : the patient is adherent with ~his/her~ medication regimen [Goals--Doing Well] : the patient is doing well with ~his/her~ COPD goals [PFTs] : pulmonary function tests [Follow-Up - Routine Clinic] : a routine clinic follow-up of

## 2021-08-25 ENCOUNTER — APPOINTMENT (OUTPATIENT)
Age: 41
End: 2021-08-25
Payer: COMMERCIAL

## 2021-08-25 PROCEDURE — 99213 OFFICE O/P EST LOW 20 MIN: CPT | Mod: 95

## 2021-08-25 NOTE — ASSESSMENT
[FreeTextEntry1] : Mild ACO \par Small lung nodules stable on repeat imaging\par Possible DAIANA \par HO WTC exposure

## 2021-08-25 NOTE — HISTORY OF PRESENT ILLNESS
[Home] : at home, [unfilled] , at the time of the visit. [Medical Office: [place]] : at the medical office located in  [Verbal consent obtained from patient] : the patient, [unfilled] [Initial Evaluation] : an initial evaluation of [Snoring] : snoring [Unrefreshing Sleep] : unrefreshing sleep [Currently Experiencing] : The patient is currently experiencing symptoms. [Good Sleep Hygiene] : good sleep hygiene [Doing Well] : doing well [None] : None [Adherent] : the patient is adherent with ~his/her~ medication regimen [Goals--Doing Well] : the patient is doing well with ~his/her~ COPD goals [PFTs] : pulmonary function tests [Follow-Up - Routine Clinic] : a routine clinic follow-up of

## 2021-09-05 ENCOUNTER — OUTPATIENT (OUTPATIENT)
Dept: OUTPATIENT SERVICES | Facility: HOSPITAL | Age: 41
LOS: 1 days | Discharge: HOME | End: 2021-09-05
Payer: COMMERCIAL

## 2021-09-05 PROCEDURE — 95810 POLYSOM 6/> YRS 4/> PARAM: CPT | Mod: 26

## 2021-09-06 DIAGNOSIS — G47.33 OBSTRUCTIVE SLEEP APNEA (ADULT) (PEDIATRIC): ICD-10-CM

## 2021-10-13 ENCOUNTER — APPOINTMENT (OUTPATIENT)
Age: 41
End: 2021-10-13
Payer: COMMERCIAL

## 2021-10-13 VITALS
HEIGHT: 72 IN | WEIGHT: 220 LBS | DIASTOLIC BLOOD PRESSURE: 60 MMHG | OXYGEN SATURATION: 97 % | BODY MASS INDEX: 29.8 KG/M2 | SYSTOLIC BLOOD PRESSURE: 124 MMHG | HEART RATE: 91 BPM

## 2021-10-13 PROCEDURE — 99214 OFFICE O/P EST MOD 30 MIN: CPT

## 2021-10-13 NOTE — HISTORY OF PRESENT ILLNESS
[Intermittent] : intermittent [Doing Well] : doing well [Well Controlled] : Well controlled [Checks Regularly] : The patient checks ~his/her~ peak flow regularly [Good Control] : peak flow has been good [Adherent] : the patient is adherent with ~his/her~ medication regimen [Goals--Doing Well] : the patient is doing well with ~his/her~ asthma goals [PFTs] : pulmonary function tests [Snoring] : snoring [Unrefreshing Sleep] : unrefreshing sleep [Currently Experiencing] : The patient is currently experiencing symptoms. [None] : No associated symptoms are reported [Good Sleep Hygiene] : good sleep hygiene [Follow-Up - Routine Clinic] : a routine clinic follow-up of

## 2021-10-13 NOTE — ASSESSMENT
[FreeTextEntry1] : Mild persistent asthma / ACO\par Small lung nodules stable on repeat imaging\par Moderate DAIANA \par HO WTC exposure

## 2021-11-03 ENCOUNTER — OUTPATIENT (OUTPATIENT)
Dept: OUTPATIENT SERVICES | Facility: HOSPITAL | Age: 41
LOS: 1 days | Discharge: HOME | End: 2021-11-03

## 2021-11-03 ENCOUNTER — LABORATORY RESULT (OUTPATIENT)
Age: 41
End: 2021-11-03

## 2021-11-03 DIAGNOSIS — Z11.59 ENCOUNTER FOR SCREENING FOR OTHER VIRAL DISEASES: ICD-10-CM

## 2021-11-06 ENCOUNTER — OUTPATIENT (OUTPATIENT)
Dept: OUTPATIENT SERVICES | Facility: HOSPITAL | Age: 41
LOS: 1 days | Discharge: HOME | End: 2021-11-06
Payer: COMMERCIAL

## 2021-11-06 PROCEDURE — 95811 POLYSOM 6/>YRS CPAP 4/> PARM: CPT | Mod: 26

## 2021-11-08 DIAGNOSIS — G47.33 OBSTRUCTIVE SLEEP APNEA (ADULT) (PEDIATRIC): ICD-10-CM

## 2021-11-18 ENCOUNTER — APPOINTMENT (OUTPATIENT)
Age: 41
End: 2021-11-18
Payer: COMMERCIAL

## 2021-11-18 VITALS
BODY MASS INDEX: 30.34 KG/M2 | DIASTOLIC BLOOD PRESSURE: 80 MMHG | HEIGHT: 72 IN | WEIGHT: 224 LBS | OXYGEN SATURATION: 97 % | RESPIRATION RATE: 12 BRPM | SYSTOLIC BLOOD PRESSURE: 120 MMHG | HEART RATE: 98 BPM

## 2021-11-18 PROCEDURE — 99213 OFFICE O/P EST LOW 20 MIN: CPT

## 2021-11-18 NOTE — REASON FOR VISIT
[Follow-Up] : a follow-up visit [COPD] : COPD [Pulmonary Nodules] : pulmonary nodules [Sleep Apnea] : sleep apnea

## 2021-11-18 NOTE — ASSESSMENT
[FreeTextEntry1] : Mild persistent asthma / ACO\par Small lung nodules stable on repeat imaging\par Moderate DAIANA optimum CPAP 9 cm H2O \par HO WTC exposure

## 2021-12-02 ENCOUNTER — APPOINTMENT (OUTPATIENT)
Dept: SURGERY | Facility: CLINIC | Age: 41
End: 2021-12-02
Payer: OTHER MISCELLANEOUS

## 2021-12-02 VITALS — BODY MASS INDEX: 29.8 KG/M2 | HEIGHT: 72 IN | WEIGHT: 220 LBS

## 2021-12-02 PROCEDURE — 99243 OFF/OP CNSLTJ NEW/EST LOW 30: CPT

## 2021-12-02 PROCEDURE — 99072 ADDL SUPL MATRL&STAF TM PHE: CPT

## 2021-12-07 NOTE — PHYSICAL EXAM
[Normal Breath Sounds] : Normal breath sounds [No Rash or Lesion] : No rash or lesion [Alert] : alert [Anxious] : anxious [JVD] : no jugular venous distention  [de-identified] : healthy [de-identified] : normal [de-identified] : protuberant abdomen, moderate diastasis recti\par  [de-identified] : supraumbilical ventral hernia

## 2021-12-07 NOTE — CONSULT LETTER
[Dear  ___] : Dear  [unfilled], [Courtesy Letter:] : I had the pleasure of seeing your patient, [unfilled], in my office today. [Please see my note below.] : Please see my note below. [Consult Closing:] : Thank you very much for allowing me to participate in the care of this patient.  If you have any questions, please do not hesitate to contact me. [DrSophie  ___] : Dr. PORTILLO [FreeTextEntry3] : Respectfully,\par \par Manolo Gonzalez M.D., FACS\par

## 2021-12-31 ENCOUNTER — EMERGENCY (EMERGENCY)
Facility: HOSPITAL | Age: 41
LOS: 0 days | Discharge: HOME | End: 2021-12-31
Attending: EMERGENCY MEDICINE | Admitting: EMERGENCY MEDICINE
Payer: COMMERCIAL

## 2021-12-31 VITALS
OXYGEN SATURATION: 96 % | HEIGHT: 72 IN | DIASTOLIC BLOOD PRESSURE: 87 MMHG | WEIGHT: 214.95 LBS | SYSTOLIC BLOOD PRESSURE: 134 MMHG | TEMPERATURE: 101 F | HEART RATE: 125 BPM | RESPIRATION RATE: 18 BRPM

## 2021-12-31 DIAGNOSIS — R05.1 ACUTE COUGH: ICD-10-CM

## 2021-12-31 DIAGNOSIS — U07.1 COVID-19: ICD-10-CM

## 2021-12-31 DIAGNOSIS — J02.9 ACUTE PHARYNGITIS, UNSPECIFIED: ICD-10-CM

## 2021-12-31 DIAGNOSIS — J44.9 CHRONIC OBSTRUCTIVE PULMONARY DISEASE, UNSPECIFIED: ICD-10-CM

## 2021-12-31 DIAGNOSIS — R50.9 FEVER, UNSPECIFIED: ICD-10-CM

## 2021-12-31 DIAGNOSIS — F09 UNSPECIFIED MENTAL DISORDER DUE TO KNOWN PHYSIOLOGICAL CONDITION: ICD-10-CM

## 2021-12-31 LAB — SARS-COV-2 RNA SPEC QL NAA+PROBE: DETECTED

## 2021-12-31 PROCEDURE — 99284 EMERGENCY DEPT VISIT MOD MDM: CPT

## 2021-12-31 RX ORDER — ACETAMINOPHEN 500 MG
975 TABLET ORAL ONCE
Refills: 0 | Status: COMPLETED | OUTPATIENT
Start: 2021-12-31 | End: 2021-12-31

## 2021-12-31 RX ADMIN — Medication 975 MILLIGRAM(S): at 04:51

## 2021-12-31 NOTE — ED PROVIDER NOTE - ATTENDING CONTRIBUTION TO CARE
40 yo M pmh of asthma, hypothyroid presents with viral symptoms Patient states that he is having chills, fever, sore throat, cough. Symptoms started yesterday but worsened today. tmax 103. no n/v/d. Patient is vaccinated but not boosted. did not take any medications prior to arrival. no chest pain, no shortness of breath, no palpitations.     CONSTITUTIONAL: Well-developed; well-nourished; in no acute distress.   SKIN: warm, dry  HEAD: Normocephalic; atraumatic.  EYES: PERRL, EOMI, no conjunctival erythema  ENT: No nasal discharge; airway clear.  NECK: Supple; non tender.  CARD: S1, S2 normal;  Regular rate and rhythm.   RESP: No wheezes, rales or rhonchi.  ABD: soft non tender, non distended, no rebound or guarding  EXT: Normal ROM.  5/5 strength in all 4 extremities   LYMPH: No acute cervical adenopathy.  NEURO: Alert, oriented, grossly unremarkable. neurovascularly intact  PSYCH: Cooperative, appropriate.

## 2021-12-31 NOTE — ED PROVIDER NOTE - PATIENT PORTAL LINK FT
You can access the FollowMyHealth Patient Portal offered by [facility] by registering at the following website: [email or web address]. By joining [facility]’s FollowMyHealth portal, you will also be able to view your health information using other applications (apps) compatible with our system.

## 2021-12-31 NOTE — ED PROVIDER NOTE - NSFOLLOWUPCLINICS_GEN_ALL_ED_FT
[facility] COVID Recovery [facility] [place] [address]  Phone: [phone]  Fax:

## 2021-12-31 NOTE — ED PROVIDER NOTE - OBJECTIVE STATEMENT
42 y/o p/w cough, sore throat, fever, chills, myalgias x 1 days, persistent, denies headache, ear pain, abdominal pain, n/v/d, respiratory sx, change in bowel habits or urinary sx, rash or other associated complaints at present.

## 2021-12-31 NOTE — ED ADULT TRIAGE NOTE - CHIEF COMPLAINT QUOTE
Pt reports feeling "unwell" since 1400 yesterday and has 103 F temp max at home. Pt reports chills and shortness of breath. Pt has a sore throat and cough.

## 2022-02-03 NOTE — ASU PATIENT PROFILE, ADULT - FALL HARM RISK - UNIVERSAL INTERVENTIONS
Bed in lowest position, wheels locked, appropriate side rails in place/Call bell, personal items and telephone in reach/Instruct patient to call for assistance before getting out of bed or chair/Non-slip footwear when patient is out of bed/[place] to call system/Physically safe environment - no spills, clutter or unnecessary equipment/Purposeful Proactive Rounding/Room/bathroom lighting operational, light cord in reach

## 2022-02-03 NOTE — ASU PATIENT PROFILE, ADULT - NSICDXPASTMEDICALHX_GEN_ALL_CORE_FT
PAST MEDICAL HISTORY:  Asthma     COPD (chronic obstructive pulmonary disease)     Glaucoma     High cholesterol     Hypothyroid     IBS (irritable bowel syndrome)     Smoker

## 2022-02-04 ENCOUNTER — OUTPATIENT (OUTPATIENT)
Dept: OUTPATIENT SERVICES | Facility: HOSPITAL | Age: 42
LOS: 1 days | Discharge: HOME | End: 2022-02-04

## 2022-02-04 ENCOUNTER — APPOINTMENT (OUTPATIENT)
Dept: SURGERY | Facility: AMBULATORY SURGERY CENTER | Age: 42
End: 2022-02-04
Payer: OTHER MISCELLANEOUS

## 2022-02-04 VITALS
SYSTOLIC BLOOD PRESSURE: 115 MMHG | HEART RATE: 76 BPM | OXYGEN SATURATION: 97 % | HEIGHT: 72 IN | TEMPERATURE: 98 F | RESPIRATION RATE: 18 BRPM | DIASTOLIC BLOOD PRESSURE: 64 MMHG | WEIGHT: 220.02 LBS

## 2022-02-04 VITALS
DIASTOLIC BLOOD PRESSURE: 73 MMHG | HEART RATE: 62 BPM | SYSTOLIC BLOOD PRESSURE: 104 MMHG | OXYGEN SATURATION: 95 % | RESPIRATION RATE: 20 BRPM

## 2022-02-04 DIAGNOSIS — Z90.49 ACQUIRED ABSENCE OF OTHER SPECIFIED PARTS OF DIGESTIVE TRACT: Chronic | ICD-10-CM

## 2022-02-04 PROCEDURE — 49560: CPT

## 2022-02-04 PROCEDURE — 49568: CPT

## 2022-02-04 RX ORDER — ALBUTEROL 90 UG/1
0 AEROSOL, METERED ORAL
Qty: 0 | Refills: 0 | DISCHARGE

## 2022-02-04 RX ORDER — ACETAMINOPHEN 500 MG
1000 TABLET ORAL ONCE
Refills: 0 | Status: COMPLETED | OUTPATIENT
Start: 2022-02-04 | End: 2022-02-04

## 2022-02-04 RX ORDER — HYDROMORPHONE HYDROCHLORIDE 2 MG/ML
0.5 INJECTION INTRAMUSCULAR; INTRAVENOUS; SUBCUTANEOUS
Refills: 0 | Status: DISCONTINUED | OUTPATIENT
Start: 2022-02-04 | End: 2022-02-04

## 2022-02-04 RX ORDER — LEVOTHYROXINE SODIUM 125 MCG
1 TABLET ORAL
Qty: 0 | Refills: 0 | DISCHARGE

## 2022-02-04 RX ORDER — ONDANSETRON 8 MG/1
4 TABLET, FILM COATED ORAL ONCE
Refills: 0 | Status: DISCONTINUED | OUTPATIENT
Start: 2022-02-04 | End: 2022-02-18

## 2022-02-04 RX ORDER — BUDESONIDE AND FORMOTEROL FUMARATE DIHYDRATE 160; 4.5 UG/1; UG/1
0 AEROSOL RESPIRATORY (INHALATION)
Qty: 0 | Refills: 0 | DISCHARGE

## 2022-02-04 RX ORDER — DORZOLAMIDE HYDROCHLORIDE TIMOLOL MALEATE 20; 5 MG/ML; MG/ML
0 SOLUTION/ DROPS OPHTHALMIC
Qty: 0 | Refills: 0 | DISCHARGE

## 2022-02-04 RX ORDER — SODIUM CHLORIDE 9 MG/ML
1000 INJECTION, SOLUTION INTRAVENOUS
Refills: 0 | Status: DISCONTINUED | OUTPATIENT
Start: 2022-02-04 | End: 2022-02-18

## 2022-02-04 RX ORDER — TRAMADOL HYDROCHLORIDE 50 MG/1
1 TABLET ORAL
Qty: 24 | Refills: 0
Start: 2022-02-04 | End: 2022-02-07

## 2022-02-04 RX ORDER — SIMVASTATIN 20 MG/1
0 TABLET, FILM COATED ORAL
Qty: 0 | Refills: 0 | DISCHARGE

## 2022-02-04 RX ORDER — OXYCODONE HYDROCHLORIDE 5 MG/1
5 TABLET ORAL ONCE
Refills: 0 | Status: DISCONTINUED | OUTPATIENT
Start: 2022-02-04 | End: 2022-02-04

## 2022-02-04 RX ADMIN — Medication 1000 MILLIGRAM(S): at 08:23

## 2022-02-04 RX ADMIN — Medication 1000 MILLIGRAM(S): at 07:26

## 2022-02-04 NOTE — ASU DISCHARGE PLAN (ADULT/PEDIATRIC) - NS MD DC FALL RISK RISK
For information on Fall & Injury Prevention, visit: [email or web address] OR  [email or web address] OR  https://www.cdc.gov/steadi/patient.html

## 2022-02-04 NOTE — PRE-ANESTHESIA EVALUATION ADULT - TEMPERATURE IN CELSIUS (DEGREES C)
36.8 Modified Advancement Flap Text: The defect edges were debeveled with a #15 scalpel blade.  Given the location of the defect, shape of the defect and the proximity to free margins a modified advancement flap was deemed most appropriate.  Using a sterile surgical marker, an appropriate advancement flap was drawn incorporating the defect and placing the expected incisions within the relaxed skin tension lines where possible.    The area thus outlined was incised deep to adipose tissue with a #15 scalpel blade.  The skin margins were undermined to an appropriate distance in all directions utilizing iris scissors.

## 2022-02-04 NOTE — ASU DISCHARGE PLAN (ADULT/PEDIATRIC) - CARE PROVIDER_API CALL
Manolo Gonzalez)  Surgery  [address]  [place] [address]  Phone: [phone]  Fax: [phone]  Scheduled Appointment: 02/15/2022

## 2022-02-08 DIAGNOSIS — H40.9 UNSPECIFIED GLAUCOMA: ICD-10-CM

## 2022-02-08 DIAGNOSIS — J45.909 UNSPECIFIED ASTHMA, UNCOMPLICATED: ICD-10-CM

## 2022-02-08 DIAGNOSIS — K43.9 VENTRAL HERNIA WITHOUT OBSTRUCTION OR GANGRENE: ICD-10-CM

## 2022-02-08 DIAGNOSIS — F17.200 NICOTINE DEPENDENCE, UNSPECIFIED, UNCOMPLICATED: ICD-10-CM

## 2022-02-08 DIAGNOSIS — E03.9 HYPOTHYROIDISM, UNSPECIFIED: ICD-10-CM

## 2022-02-08 DIAGNOSIS — J44.9 CHRONIC OBSTRUCTIVE PULMONARY DISEASE, UNSPECIFIED: ICD-10-CM

## 2022-02-08 DIAGNOSIS — E78.00 PURE HYPERCHOLESTEROLEMIA, UNSPECIFIED: ICD-10-CM

## 2022-02-17 ENCOUNTER — APPOINTMENT (OUTPATIENT)
Dept: SURGERY | Facility: CLINIC | Age: 42
End: 2022-02-17
Payer: COMMERCIAL

## 2022-02-17 ENCOUNTER — APPOINTMENT (OUTPATIENT)
Age: 42
End: 2022-02-17

## 2022-02-17 DIAGNOSIS — E66.09 OTHER OBESITY DUE TO EXCESS CALORIES: ICD-10-CM

## 2022-02-17 DIAGNOSIS — K43.9 VENTRAL HERNIA W/OUT OBSTRUCTION OR GANGRENE: ICD-10-CM

## 2022-02-17 DIAGNOSIS — M62.08 SEPARATION OF MUSCLE (NONTRAUMATIC), OTHER SITE: ICD-10-CM

## 2022-02-17 PROCEDURE — 99024 POSTOP FOLLOW-UP VISIT: CPT

## 2022-02-17 NOTE — CONSULT LETTER
[FreeTextEntry1] : Dear Dr. Cullen Subramanian, \par \par I had the pleasure of seeing your patient, CHAPARRO ALSTON, in my office today. Please see my note below. \par \par Thank you very much for allowing me to participate in the care of this patient. If you have any questions, please do not hesitate to contact me. \par \par \par Respectfully,\par \par Manolo Gonzalez M.D., FACS\par  \par \par \par cc: Dr. Ubaldo Farooq

## 2022-02-17 NOTE — ASSESSMENT
[FreeTextEntry1] : Edward underwent the repair of his supraumbilical ventral hernia with mesh on February 4, 2022 under local with IV sedation without any problems or complications. His wound is clean, dry and intact. There is no evidence of erythema, seroma formation or infection. He is tolerating a diet and having normal bowel movements. He denies any significant postoperative pain or discomfort at this time.\par \par He was counseled and reassured. Edward was discharged from the office with no specific followup necessary, but he knows to avoid any heavy lifting or strenuous activity for the next several weeks. We also discussed the importance of calorie restriction and healthy eating with regard to weight loss, hernia recurrence and his overall health. He was encouraged to continue to wear his abdominal binder for the better part of the next month.

## 2022-05-23 PROBLEM — E78.00 PURE HYPERCHOLESTEROLEMIA, UNSPECIFIED: Chronic | Status: ACTIVE | Noted: 2022-02-04

## 2022-05-23 PROBLEM — H40.9 UNSPECIFIED GLAUCOMA: Chronic | Status: ACTIVE | Noted: 2022-02-04

## 2022-05-23 PROBLEM — K58.9 IRRITABLE BOWEL SYNDROME WITHOUT DIARRHEA: Chronic | Status: ACTIVE | Noted: 2022-02-04

## 2022-05-23 PROBLEM — E03.9 HYPOTHYROIDISM, UNSPECIFIED: Chronic | Status: ACTIVE | Noted: 2022-02-04

## 2022-05-23 PROBLEM — J45.909 UNSPECIFIED ASTHMA, UNCOMPLICATED: Chronic | Status: ACTIVE | Noted: 2022-02-04

## 2022-06-16 ENCOUNTER — APPOINTMENT (OUTPATIENT)
Dept: UROLOGY | Facility: CLINIC | Age: 42
End: 2022-06-16
Payer: COMMERCIAL

## 2022-06-16 VITALS — WEIGHT: 220 LBS | HEIGHT: 72 IN | BODY MASS INDEX: 29.8 KG/M2

## 2022-06-16 DIAGNOSIS — Z72.89 OTHER PROBLEMS RELATED TO LIFESTYLE: ICD-10-CM

## 2022-06-16 DIAGNOSIS — H40.9 UNSPECIFIED GLAUCOMA: ICD-10-CM

## 2022-06-16 DIAGNOSIS — K58.9 IRRITABLE BOWEL SYNDROME W/OUT DIARRHEA: ICD-10-CM

## 2022-06-16 DIAGNOSIS — Z78.9 OTHER SPECIFIED HEALTH STATUS: ICD-10-CM

## 2022-06-16 DIAGNOSIS — E03.9 HYPOTHYROIDISM, UNSPECIFIED: ICD-10-CM

## 2022-06-16 DIAGNOSIS — R39.16 STRAINING TO VOID: ICD-10-CM

## 2022-06-16 PROCEDURE — 99204 OFFICE O/P NEW MOD 45 MIN: CPT

## 2022-06-16 NOTE — ASSESSMENT
[FreeTextEntry1] : CHAPARRO ALSTON is a 42 year old male who presents for consultation for worsening LUTS, and overall vague symptoms, along with unintentional weight gain.\par Suspect pelvic floor dysfunction given tense levators on examination.  He may also have a degree of somatization disorder.\par \par Recommend urinalysis urine culture\par PSA\par Abdominal ultrasound\par Bladder ultrasound assess prostate as well\par Follow-up for uroflow and PVR\par Stress reduction\par Decrease bladder irritants\par

## 2022-06-16 NOTE — HISTORY OF PRESENT ILLNESS
[FreeTextEntry1] : CHAPARRO ALSTON is a 42 year old male who presents for consultation for worsening LUTS.\par \par Patient reports that in the last year he has had worsening lower urinary tract symptoms including urinary frequency and urgency along with hesitancy sensation of incomplete bladder emptying.  Nocturia 2 times.\par Denies gross hematuria, dysuria or associated symptoms. \par \par He also complains of vague symptoms of epigastric pain when he has to urinate.  Has been seen multiple doctors for his vague pain symptoms such as his chest pain rating to his arm and cardiac disorder has been ruled out.\par Patient also complains of 35 pound unintentional weight gain in the last year.\par \par Denies  PMH including previous kidney stones, recurrent UTIs. \par Family History: Father recently diagnosed with bladder cancer\par Social History:under considerable stress, daughter 6 years old undergoing tx for pediatric disorders such as macrocytosis, former smoker, 9/11 exposure while working at [employer]\par \par Creatinine 1.10 March 2022\par A1c 5.0\par UA negative for microscopic hematuria or pyuria January 2022 \par Uculture no growth to date\par \par CT abdomen pelvis images visualized from 2020 demonstrating no stones bilaterally in the kidneys no hydronephrosis no adrenal masses.

## 2022-06-16 NOTE — PHYSICAL EXAM
[General Appearance - Well Developed] : well developed [General Appearance - Well Nourished] : well nourished [Normal Appearance] : normal appearance [Well Groomed] : well groomed [General Appearance - In No Acute Distress] : no acute distress [Edema] : no peripheral edema [Respiration, Rhythm And Depth] : normal respiratory rhythm and effort [Abdomen Soft] : soft [Exaggerated Use Of Accessory Muscles For Inspiration] : no accessory muscle use [Abdomen Tenderness] : non-tender [Costovertebral Angle Tenderness] : no ~M costovertebral angle tenderness [Urethral Meatus] : meatus normal [Urinary Bladder Findings] : the bladder was normal on palpation [Scrotum] : the scrotum was normal [Testes Mass (___cm)] : there were no testicular masses [No Prostate Nodules] : no prostate nodules [Prostate Size ___ gm] : prostate size [unfilled] gm [FreeTextEntry1] : no levator tenderness however significant tense levators  [Normal Station and Gait] : the gait and station were normal for the patient's age [] : no rash [No Focal Deficits] : no focal deficits [Oriented To Time, Place, And Person] : oriented to person, place, and time [Affect] : the affect was normal [Mood] : the mood was normal [Not Anxious] : not anxious [No Palpable Adenopathy] : no palpable adenopathy

## 2022-06-17 LAB
PSA FREE FLD-MCNC: 36 %
PSA FREE SERPL-MCNC: 0.19 NG/ML
PSA SERPL-MCNC: 0.53 NG/ML

## 2022-06-29 ENCOUNTER — NON-APPOINTMENT (OUTPATIENT)
Age: 42
End: 2022-06-29

## 2022-06-29 LAB — BACTERIA UR CULT: NORMAL

## 2022-07-19 ENCOUNTER — APPOINTMENT (OUTPATIENT)
Dept: UROLOGY | Facility: CLINIC | Age: 42
End: 2022-07-19

## 2022-08-30 ENCOUNTER — APPOINTMENT (OUTPATIENT)
Dept: UROLOGY | Facility: CLINIC | Age: 42
End: 2022-08-30

## 2022-08-30 VITALS
WEIGHT: 220 LBS | HEART RATE: 90 BPM | TEMPERATURE: 97.8 F | DIASTOLIC BLOOD PRESSURE: 81 MMHG | RESPIRATION RATE: 15 BRPM | HEIGHT: 72 IN | SYSTOLIC BLOOD PRESSURE: 132 MMHG | BODY MASS INDEX: 29.8 KG/M2

## 2022-08-30 DIAGNOSIS — D18.03 HEMANGIOMA OF INTRA-ABDOMINAL STRUCTURES: ICD-10-CM

## 2022-08-30 DIAGNOSIS — R39.11 HESITANCY OF MICTURITION: ICD-10-CM

## 2022-08-30 DIAGNOSIS — R39.9 UNSPECIFIED SYMPTOMS AND SIGNS INVOLVING THE GENITOURINARY SYSTEM: ICD-10-CM

## 2022-08-30 PROCEDURE — 99214 OFFICE O/P EST MOD 30 MIN: CPT

## 2022-08-30 NOTE — HISTORY OF PRESENT ILLNESS
[FreeTextEntry1] : CHAPARRO ALSTON is a 42 year old male who presents for consultation for worsening LUTS, and overall vague symptoms, along with unintentional weight gain.\par \par Pt denies gross hematuria , dysuria , lower flank pain , or associated symptoms . \par Symptoms stable.\par \par RBUS 07/27/22 images visualized  - Mild hepatomegaly with moderate stenosis . A small echogenic in the right lobe likely benign , suspected to represent a hemangioma . Unremarkable sonographic appearance of the Urinary Bladder, prostate 40 g without intravesical protrusion.  No PVR . \par Agree with findings.\par \par U/A 06/17/22 - negative \par PSA 06/17/22 - 0.53 , % free 36 \par UCX- 06/29/22 - unremarkable \par \par Previously \par Patient reports that in the last year he has had worsening lower urinary tract symptoms including urinary frequency and urgency along with hesitancy sensation of incomplete bladder emptying.  Nocturia 2 times.\par Denies gross hematuria, dysuria or associated symptoms. \par \par He also complains of vague symptoms of epigastric pain when he has to urinate.  Has been seen multiple doctors for his vague pain symptoms such as his chest pain rating to his arm and cardiac disorder has been ruled out.\par Patient also complains of 35 pound unintentional weight gain in the last year.\par \par Denies  PMH including previous kidney stones, recurrent UTIs. \par Family History: Father recently diagnosed with bladder cancer\par Social History:under considerable stress, daughter 6 years old undergoing tx for pediatric disorders such as macrocytosis, former smoker, 9/11 exposure while working at [employer]\par \par Creatinine 1.10 March 2022\par A1c 5.0\par UA negative for microscopic hematuria or pyuria January 2022 \par Uculture no growth to date\par \par CT abdomen pelvis images visualized from 2020 demonstrating no stones bilaterally in the kidneys no hydronephrosis no adrenal masses.

## 2022-08-30 NOTE — ASSESSMENT
[FreeTextEntry1] : CHAPARRO ALSTON is a 42 year old male who presents for consultation for worsening LUTS, and overall vague symptoms, along with unintentional weight gain.\par Imaging and lab work-up negative.\par Suspect pelvic floor dysfunction given tense levators on examination.  He may also have a degree of somatization disorder.\par \par We discussed options at this time such as urodynamics for further characterization of symptoms and we discussed potential options for medical management.  Patient elects observation of lower urinary tract symptoms.\par \par - f/u PRN if worsening symptoms or hematuria etc.\par -PSA tests with PCP \par Continue stress reduction\par Continue decrease bladder irritants\par Regarding the patient's liver lesion printout was given if his abdominal ultrasound he will followed up with his primary care doctor

## 2022-08-30 NOTE — ADDENDUM
[FreeTextEntry1] : Patient's note was transcribed with the assistance of a medical scribe under the supervision of Dr. Patel.\par I, Dr. Patel, have reviewed the patient's chart and agree that it aligns with my medical decisions.\par Mya Morocho, our scribe, also served as a chaperone for physical examination purposes.\par \par \par

## 2022-11-09 ENCOUNTER — APPOINTMENT (OUTPATIENT)
Dept: CARDIOLOGY | Facility: CLINIC | Age: 42
End: 2022-11-09

## 2022-11-09 VITALS
HEART RATE: 86 BPM | SYSTOLIC BLOOD PRESSURE: 108 MMHG | HEIGHT: 72 IN | WEIGHT: 225 LBS | DIASTOLIC BLOOD PRESSURE: 80 MMHG | BODY MASS INDEX: 30.48 KG/M2 | OXYGEN SATURATION: 97 %

## 2022-11-09 DIAGNOSIS — G47.30 SLEEP APNEA, UNSPECIFIED: ICD-10-CM

## 2022-11-09 DIAGNOSIS — Z82.49 FAMILY HISTORY OF ISCHEMIC HEART DISEASE AND OTHER DISEASES OF THE CIRCULATORY SYSTEM: ICD-10-CM

## 2022-11-09 PROCEDURE — 93306 TTE W/DOPPLER COMPLETE: CPT

## 2022-11-09 PROCEDURE — 93000 ELECTROCARDIOGRAM COMPLETE: CPT

## 2022-11-09 PROCEDURE — 99204 OFFICE O/P NEW MOD 45 MIN: CPT | Mod: 25

## 2022-11-09 RX ORDER — LEVOTHYROXINE SODIUM 0.14 MG/1
137 TABLET ORAL DAILY
Refills: 0 | Status: ACTIVE | COMMUNITY

## 2022-11-09 RX ORDER — ATORVASTATIN CALCIUM 40 MG/1
40 TABLET, FILM COATED ORAL DAILY
Refills: 0 | Status: DISCONTINUED | COMMUNITY

## 2022-11-13 NOTE — HISTORY OF PRESENT ILLNESS
[FreeTextEntry1] : Patient is a 42 yr-old male with hx of HLD, asthma, DAIANA on CPAP, GERD, self-reported "fatty liver," and family hx of early onset-CAD presenting for evaluation of chest pain and high cholesterol.  Patient states he has had constant, sharp, left-sided chest discomfort for three years.  Pain worsens with certain movements and feels better with palpitation.  He also states it worsens when he holds in his urine (patient is a ).  Patient also complains of shortness of breath with exertion that resolves with rest.  He believes it is due to his asthma.  He denies palpitations, dizziness or leg swelling at this time.  To note, patient had CCTA done at [facility] in 3/2021 that revealed, although limited by motion artifact, a calcium score of 0 and grossly patent coronary arteries.  Also, patient states he is seeing a pulmonologist for lung nodules incidentally found on CCTA.\par \par Patient states he does not take his omeprazole for his GERD.  He reports gaining 30lbs over the past few years.  Actively vapes.  \par \par Recent blood work reviewed from 3/2022: , HDL 42, , tri 305, HgbA1C 5

## 2022-11-13 NOTE — ASSESSMENT
[FreeTextEntry1] : #chest pain\par #dyspnea\par #HLD\par \par Plan:\par -Given patient's recent negative cardiac workup, suspicion of active CAD is low.  However, since patient has a  family hx of early-onset CAD and a personal hx of uncontrolled HLD, a stress echo is recommended to rule out     myocardial ischemia.\par -Obtain TTE to assess cardiac function and valvular structures.\par -Repeat lipid panel and CMP to evaluate appropriate lipid management.\par -Patient strongly urged to be complaint with his omeprazole and assess if his symptoms are alleviated.  \par -Continue to followup with pulmonary regarding pulmonary nodules, asthma, and DAIANA.\par -Weight loss, dietary modification, and smoking cessation discussed.  \par -Followup after testing.\par -Patient aware of plan.

## 2022-11-13 NOTE — CARDIOLOGY SUMMARY
[de-identified] : 11/9/22\par NSR @ 78 bpm.  Nonspecific septal T wave changes.   [de-identified] : 3/2021: CCTA was limited by motion artifact but calcium score was 0 and arteries grossly patent.

## 2022-11-13 NOTE — REVIEW OF SYSTEMS
[Weight Gain (___ Lbs)] : [unfilled] ~Ulb weight gain [Dyspnea on exertion] : dyspnea during exertion [Chest Discomfort] : chest discomfort [Negative] : Heme/Lymph [Palpitations] : no palpitations [Syncope] : no syncope

## 2022-11-16 LAB
ALBUMIN SERPL ELPH-MCNC: 5.1 G/DL
ALP BLD-CCNC: 103 U/L
ALT SERPL-CCNC: 63 U/L
ANION GAP SERPL CALC-SCNC: 13 MMOL/L
AST SERPL-CCNC: 31 U/L
BILIRUB SERPL-MCNC: 0.6 MG/DL
BUN SERPL-MCNC: 12 MG/DL
CALCIUM SERPL-MCNC: 10.6 MG/DL
CHLORIDE SERPL-SCNC: 102 MMOL/L
CHOLEST SERPL-MCNC: 215 MG/DL
CO2 SERPL-SCNC: 25 MMOL/L
CREAT SERPL-MCNC: 1 MG/DL
EGFR: 95 ML/MIN/1.73M2
GLUCOSE SERPL-MCNC: 86 MG/DL
HDLC SERPL-MCNC: 38 MG/DL
LDLC SERPL CALC-MCNC: 124 MG/DL
NONHDLC SERPL-MCNC: 177 MG/DL
POTASSIUM SERPL-SCNC: 4.2 MMOL/L
PROT SERPL-MCNC: 7.8 G/DL
SODIUM SERPL-SCNC: 140 MMOL/L
TRIGL SERPL-MCNC: 265 MG/DL

## 2022-11-16 RX ORDER — ATORVASTATIN CALCIUM 20 MG/1
20 TABLET, FILM COATED ORAL
Qty: 90 | Refills: 3 | Status: ACTIVE | COMMUNITY
Start: 2022-11-16 | End: 1900-01-01

## 2022-11-16 RX ORDER — ATORVASTATIN CALCIUM 10 MG/1
10 TABLET, FILM COATED ORAL
Qty: 30 | Refills: 0 | Status: DISCONTINUED | COMMUNITY
Start: 2022-03-31 | End: 2022-11-16

## 2022-12-22 ENCOUNTER — APPOINTMENT (OUTPATIENT)
Dept: CARDIOLOGY | Facility: CLINIC | Age: 42
End: 2022-12-22
Payer: COMMERCIAL

## 2022-12-22 DIAGNOSIS — E66.9 OBESITY, UNSPECIFIED: ICD-10-CM

## 2022-12-22 DIAGNOSIS — R06.02 SHORTNESS OF BREATH: ICD-10-CM

## 2022-12-22 PROCEDURE — 93325 DOPPLER ECHO COLOR FLOW MAPG: CPT

## 2022-12-22 PROCEDURE — 93351 STRESS TTE COMPLETE: CPT

## 2022-12-22 PROCEDURE — 99214 OFFICE O/P EST MOD 30 MIN: CPT | Mod: 25

## 2022-12-22 PROCEDURE — 93320 DOPPLER ECHO COMPLETE: CPT

## 2022-12-22 NOTE — PHYSICAL EXAM
[No Murmur] : no murmur [Well Developed] : well developed [Well Nourished] : well nourished [No Acute Distress] : no acute distress [Normal Conjunctiva] : normal conjunctiva [Normal Venous Pressure] : normal venous pressure [No Carotid Bruit] : no carotid bruit [Normal S1, S2] : normal S1, S2 [No Rub] : no rub [No Gallop] : no gallop [Murmur] : murmur [Clear Lung Fields] : clear lung fields [Good Air Entry] : good air entry [No Respiratory Distress] : no respiratory distress  [Soft] : abdomen soft [Non Tender] : non-tender [No Masses/organomegaly] : no masses/organomegaly [Normal Bowel Sounds] : normal bowel sounds [Normal Gait] : normal gait [No Edema] : no edema [No Cyanosis] : no cyanosis [No Clubbing] : no clubbing [No Varicosities] : no varicosities [No Rash] : no rash [No Skin Lesions] : no skin lesions [Moves all extremities] : moves all extremities [No Focal Deficits] : no focal deficits [Normal Speech] : normal speech [Alert and Oriented] : alert and oriented [Normal memory] : normal memory

## 2022-12-27 NOTE — CARDIOLOGY SUMMARY
[de-identified] : 11/9/22\par NSR @ 78 bpm.  Nonspecific septal T wave changes.   [de-identified] : 3/2021: CCTA was limited by motion artifact but calcium score was 0 and arteries grossly patent.  [de-identified] : Stress Echo - 12/22/2022: No evidence of exercise-induced ischemia at 85% MPHR.

## 2022-12-27 NOTE — DISCUSSION/SUMMARY
[FreeTextEntry1] : SOB on Exertion: Stress echocardiogram performed today revealed no evidence of exercise-induced ischemia at 85% MPHR.\par \par HLD: The impression is hyperlipidemia. Currently, the condition is stable. There are no changes in medication management. Continue current medical therapy. Other planned treatment includes diet modification, exercise, and weight loss.\par \par Obesity: Discussed risks of obesity with the patient. Advised weight loss with dietary modification and increased physical activity/exercise.\par \par Plan was discussed with the patient.

## 2022-12-27 NOTE — ASSESSMENT
[FreeTextEntry1] : Stress echo performed today revealed no evidence of exercise-induced ischemia at 85% MPHR.\par \par Plan:\par -Lab requisition given to patient to repeat lipid panel and CMP in 4 weeks.\par -Increased aerobic activity strongly encouraged.  Weight loss, dietary modification, and smoking cessation  also discussed.  \par -Followup blood work results.  Continue atorvastatin 20mg for now.  \par -Patient aware of plan.

## 2022-12-27 NOTE — CARDIOLOGY SUMMARY
[de-identified] : 11/9/22\par NSR @ 78 bpm.  Nonspecific septal T wave changes.   [de-identified] : 3/2021: CCTA was limited by motion artifact but calcium score was 0 and arteries grossly patent.  [de-identified] : Stress Echo - 12/22/2022: No evidence of exercise-induced ischemia at 85% MPHR.

## 2022-12-27 NOTE — HISTORY OF PRESENT ILLNESS
[FreeTextEntry1] : CHAPARRO ALSTON is a 42-year-old male, with a PMHx significant for HLD, asthma, DAIANA (on CPAP), GERD, self-reported "fatty liver," and family hx of early onset-CAD, who presents today for a stress echocardiogram. Patient was initially seen on 11/9/2022 for evaluation of chest pain, SOB, and high cholesterol. Chest pain was described as constant, sharp, left-sided chest discomfort x 3 years. Worse with certain movements and better with palpation. SOB is exertional and relieved with rest. Of note, patient had a CCTA done at [facility] in 3/2021 which revealed, although limited by motion artifact, a calcium score of 0 and grossly patent coronary arteries. Lung nodules also noted on CCTA, for which he is seeing a pulmonologist. \par \par Lab results from 11/14/2022:\par Triglyceride: 265\par Cholesterol: 215\par HDL: 38 \par LDL: 123\par ALT: 63\par Results with discussed with the patient on 11/16/2022 and Atorvastatin was increased to 20 mg QD.\par \par Stress echocardiogram performed today revealed no evidence of exercise-induced ischemia at 85% MPHR.

## 2022-12-27 NOTE — HISTORY OF PRESENT ILLNESS
[FreeTextEntry1] : CHAPARRO ALSTON is a 42-year-old male, with a PMHx significant for HLD, asthma, DAIANA (on CPAP), GERD, self-reported "fatty liver," and family hx of early onset-CAD, who presents today for a stress echocardiogram. Patient was initially seen on 11/9/2022 for evaluation of chest pain, SOB, and high cholesterol. Chest pain was described as constant, sharp, left-sided chest discomfort x 3 years. Worse with certain movements and better with palpation. SOB is exertional and relieved with rest. Of note, patient had a CCTA done at [facility] in 3/2021 which revealed, although limited by motion artifact, a calcium score of 0 and grossly patent coronary arteries. Lung nodules also noted on CCTA, for which he is seeing a pulmonologist. \par \par Lab results from 11/14/2022:\par Triglyceride: 265\par Cholesterol: 215\par HDL: 38 \par LDL: 123\par ALT: 63\par Results with discussed with the patient on 11/16/2022 and Atorvastatin was increased to 20 mg QD.\par \par Stress echocardiogram performed today revealed no evidence of exercise-induced ischemia at 85% MPHR.

## 2022-12-27 NOTE — REVIEW OF SYSTEMS
[Weight Gain (___ Lbs)] : [unfilled] ~Ulb weight gain [Dyspnea on exertion] : dyspnea during exertion [Chest Discomfort] : chest discomfort [Negative] : Heme/Lymph [Lower Ext Edema] : no extremity edema [Leg Claudication] : no intermittent leg claudication [Palpitations] : no palpitations [Syncope] : no syncope [Cough] : no cough [FreeTextEntry5] : (+) SOB on Exertion, (+) Chest Pain [FreeTextEntry6] : (+) SOB on Exertion

## 2022-12-29 ENCOUNTER — NON-APPOINTMENT (OUTPATIENT)
Age: 42
End: 2022-12-29

## 2023-03-16 ENCOUNTER — APPOINTMENT (OUTPATIENT)
Dept: PULMONOLOGY | Facility: CLINIC | Age: 43
End: 2023-03-16
Payer: COMMERCIAL

## 2023-03-16 VITALS
DIASTOLIC BLOOD PRESSURE: 80 MMHG | SYSTOLIC BLOOD PRESSURE: 120 MMHG | OXYGEN SATURATION: 97 % | HEART RATE: 85 BPM | RESPIRATION RATE: 12 BRPM | WEIGHT: 230 LBS | HEIGHT: 72 IN | BODY MASS INDEX: 31.15 KG/M2

## 2023-03-16 DIAGNOSIS — Z87.09 PERSONAL HISTORY OF OTHER DISEASES OF THE RESPIRATORY SYSTEM: ICD-10-CM

## 2023-03-16 PROCEDURE — 94010 BREATHING CAPACITY TEST: CPT

## 2023-03-16 PROCEDURE — 99214 OFFICE O/P EST MOD 30 MIN: CPT | Mod: 25

## 2023-03-16 NOTE — REASON FOR VISIT
[Follow-Up] : a follow-up visit [Sleep Apnea] : sleep apnea [COPD] : COPD [Pulmonary Nodules] : pulmonary nodules

## 2023-03-16 NOTE — ASSESSMENT
[FreeTextEntry1] : INtermittent asthma well compensated \par Small lung nodules stable on repeat imaging for years \par Moderate DAIANA optimum CPAP 9 cm H2O.  Not using PAP therapy \par HO WTC exposure

## 2023-03-16 NOTE — HISTORY OF PRESENT ILLNESS
[Intermittent] : intermittent [Doing Well] : doing well [Well Controlled] : Well controlled [Checks Regularly] : The patient checks ~his/her~ peak flow regularly [Good Control] : peak flow has been good [Adherent] : the patient is adherent with ~his/her~ medication regimen [Goals--Doing Well] : the patient is doing well with ~his/her~ asthma goals [PFTs] : pulmonary function tests [Snoring] : snoring [Unrefreshing Sleep] : unrefreshing sleep [Currently Experiencing] : The patient is currently experiencing symptoms. [Follow-Up - Routine Clinic] : a routine clinic follow-up of [None] : The patient is not currently being treated for this problem [Poor Compliance] : poor compliance with treatment

## 2023-04-17 DIAGNOSIS — E78.00 PURE HYPERCHOLESTEROLEMIA, UNSPECIFIED: ICD-10-CM

## 2023-04-24 NOTE — ED ADULT NURSE NOTE - NSSEPSISSUSPECTED_ED_A_ED
Patient: Bret Ulrich    Procedure Summary     Date: 04/24/23 Room / Location: [facility] OR  /  [facility]    Anesthesia Start: 0717 Anesthesia Stop:     Procedure: CHOLECYSTECTOMY LAPAROSCOPIC INTRAOPERATIVE CHOLANGIOGRAPHY, excision of vein right leg (Abdomen) Diagnosis:       Calculus of gallbladder without cholecystitis without obstruction      Class 3 severe obesity due to excess calories with body mass index (BMI) of 50.0 to 59.9 in adult, unspecified whether serious comorbidity present      Splenic mass      (Calculus of gallbladder without cholecystitis without obstruction [K80.20])      (Class 3 severe obesity due to excess calories with body mass index (BMI) of 50.0 to 59.9 in adult, unspecified whether serious comorbidity present [E66.01, Z68.43])      (Splenic mass [R16.1])    Surgeons: Tatyana Mohan MD Provider: Jeff Reid CRNA    Anesthesia Type: general ASA Status: 3          Anesthesia Type: general    Vitals  No vitals data found for the desired time range.          Post Anesthesia Care and Evaluation    Patient location during evaluation: PHASE II  Patient participation: complete - patient participated  Level of consciousness: awake  Pain score: 0  Pain management: adequate    Airway patency: patent  Anesthetic complications: No anesthetic complications  PONV Status: none  Cardiovascular status: acceptable  Respiratory status: acceptable and face mask  Hydration status: acceptable    Comments: vsss resp spont, reflexes intact, responsive, report given to pacu nurseSee R.N. note for postop vital signs.       No

## 2023-08-18 ENCOUNTER — EMERGENCY (EMERGENCY)
Facility: HOSPITAL | Age: 43
LOS: 0 days | Discharge: LEFT BEFORE DISCHARGE | End: 2023-08-18
Attending: EMERGENCY MEDICINE
Payer: COMMERCIAL

## 2023-08-18 VITALS
DIASTOLIC BLOOD PRESSURE: 80 MMHG | RESPIRATION RATE: 18 BRPM | HEART RATE: 85 BPM | SYSTOLIC BLOOD PRESSURE: 135 MMHG | OXYGEN SATURATION: 100 % | TEMPERATURE: 98 F

## 2023-08-18 DIAGNOSIS — E78.00 PURE HYPERCHOLESTEROLEMIA, UNSPECIFIED: ICD-10-CM

## 2023-08-18 DIAGNOSIS — R07.89 OTHER CHEST PAIN: ICD-10-CM

## 2023-08-18 DIAGNOSIS — K58.9 IRRITABLE BOWEL SYNDROME WITHOUT DIARRHEA: ICD-10-CM

## 2023-08-18 DIAGNOSIS — Z90.49 ACQUIRED ABSENCE OF OTHER SPECIFIED PARTS OF DIGESTIVE TRACT: Chronic | ICD-10-CM

## 2023-08-18 DIAGNOSIS — J44.9 CHRONIC OBSTRUCTIVE PULMONARY DISEASE, UNSPECIFIED: ICD-10-CM

## 2023-08-18 DIAGNOSIS — Z53.29 PROCEDURE AND TREATMENT NOT CARRIED OUT BECAUSE OF PATIENT'S DECISION FOR OTHER REASONS: ICD-10-CM

## 2023-08-18 DIAGNOSIS — K21.9 GASTRO-ESOPHAGEAL REFLUX DISEASE WITHOUT ESOPHAGITIS: ICD-10-CM

## 2023-08-18 DIAGNOSIS — E03.9 HYPOTHYROIDISM, UNSPECIFIED: ICD-10-CM

## 2023-08-18 DIAGNOSIS — Z86.59 PERSONAL HISTORY OF OTHER MENTAL AND BEHAVIORAL DISORDERS: ICD-10-CM

## 2023-08-18 DIAGNOSIS — Z87.19 PERSONAL HISTORY OF OTHER DISEASES OF THE DIGESTIVE SYSTEM: ICD-10-CM

## 2023-08-18 DIAGNOSIS — R91.8 OTHER NONSPECIFIC ABNORMAL FINDING OF LUNG FIELD: ICD-10-CM

## 2023-08-18 DIAGNOSIS — F17.210 NICOTINE DEPENDENCE, CIGARETTES, UNCOMPLICATED: ICD-10-CM

## 2023-08-18 LAB
ALBUMIN SERPL ELPH-MCNC: 5 G/DL — SIGNIFICANT CHANGE UP (ref 3.5–5.2)
ALP SERPL-CCNC: 95 U/L — SIGNIFICANT CHANGE UP (ref 30–115)
ALT FLD-CCNC: 42 U/L — HIGH (ref 0–41)
ANION GAP SERPL CALC-SCNC: 10 MMOL/L — SIGNIFICANT CHANGE UP (ref 7–14)
AST SERPL-CCNC: 30 U/L — SIGNIFICANT CHANGE UP (ref 0–41)
BASOPHILS # BLD AUTO: 0.05 K/UL — SIGNIFICANT CHANGE UP (ref 0–0.2)
BASOPHILS NFR BLD AUTO: 0.8 % — SIGNIFICANT CHANGE UP (ref 0–1)
BILIRUB SERPL-MCNC: 0.5 MG/DL — SIGNIFICANT CHANGE UP (ref 0.2–1.2)
BUN SERPL-MCNC: 11 MG/DL — SIGNIFICANT CHANGE UP (ref 10–20)
CALCIUM SERPL-MCNC: 9.7 MG/DL — SIGNIFICANT CHANGE UP (ref 8.4–10.4)
CHLORIDE SERPL-SCNC: 102 MMOL/L — SIGNIFICANT CHANGE UP (ref 98–110)
CO2 SERPL-SCNC: 26 MMOL/L — SIGNIFICANT CHANGE UP (ref 17–32)
CREAT SERPL-MCNC: 1 MG/DL — SIGNIFICANT CHANGE UP (ref 0.7–1.5)
EGFR: 95 ML/MIN/1.73M2 — SIGNIFICANT CHANGE UP
EOSINOPHIL # BLD AUTO: 0.22 K/UL — SIGNIFICANT CHANGE UP (ref 0–0.7)
EOSINOPHIL NFR BLD AUTO: 3.4 % — SIGNIFICANT CHANGE UP (ref 0–8)
GLUCOSE SERPL-MCNC: 103 MG/DL — HIGH (ref 70–99)
HCT VFR BLD CALC: 44.2 % — SIGNIFICANT CHANGE UP (ref 42–52)
HGB BLD-MCNC: 16.1 G/DL — SIGNIFICANT CHANGE UP (ref 14–18)
IMM GRANULOCYTES NFR BLD AUTO: 1.5 % — HIGH (ref 0.1–0.3)
LYMPHOCYTES # BLD AUTO: 1.83 K/UL — SIGNIFICANT CHANGE UP (ref 1.2–3.4)
LYMPHOCYTES # BLD AUTO: 28.1 % — SIGNIFICANT CHANGE UP (ref 20.5–51.1)
MCHC RBC-ENTMCNC: 31.3 PG — HIGH (ref 27–31)
MCHC RBC-ENTMCNC: 36.4 G/DL — SIGNIFICANT CHANGE UP (ref 32–37)
MCV RBC AUTO: 85.8 FL — SIGNIFICANT CHANGE UP (ref 80–94)
MONOCYTES # BLD AUTO: 0.47 K/UL — SIGNIFICANT CHANGE UP (ref 0.1–0.6)
MONOCYTES NFR BLD AUTO: 7.2 % — SIGNIFICANT CHANGE UP (ref 1.7–9.3)
NEUTROPHILS # BLD AUTO: 3.85 K/UL — SIGNIFICANT CHANGE UP (ref 1.4–6.5)
NEUTROPHILS NFR BLD AUTO: 59 % — SIGNIFICANT CHANGE UP (ref 42.2–75.2)
NRBC # BLD: 0 /100 WBCS — SIGNIFICANT CHANGE UP (ref 0–0)
PLATELET # BLD AUTO: 195 K/UL — SIGNIFICANT CHANGE UP (ref 130–400)
PMV BLD: 10.6 FL — HIGH (ref 7.4–10.4)
POTASSIUM SERPL-MCNC: 3.9 MMOL/L — SIGNIFICANT CHANGE UP (ref 3.5–5)
POTASSIUM SERPL-SCNC: 3.9 MMOL/L — SIGNIFICANT CHANGE UP (ref 3.5–5)
PROT SERPL-MCNC: 7.3 G/DL — SIGNIFICANT CHANGE UP (ref 6–8)
RBC # BLD: 5.15 M/UL — SIGNIFICANT CHANGE UP (ref 4.7–6.1)
RBC # FLD: 13.7 % — SIGNIFICANT CHANGE UP (ref 11.5–14.5)
SODIUM SERPL-SCNC: 138 MMOL/L — SIGNIFICANT CHANGE UP (ref 135–146)
TROPONIN T SERPL-MCNC: <0.01 NG/ML — SIGNIFICANT CHANGE UP
WBC # BLD: 6.52 K/UL — SIGNIFICANT CHANGE UP (ref 4.8–10.8)
WBC # FLD AUTO: 6.52 K/UL — SIGNIFICANT CHANGE UP (ref 4.8–10.8)

## 2023-08-18 PROCEDURE — 71260 CT THORAX DX C+: CPT | Mod: 26,MA

## 2023-08-18 PROCEDURE — 71045 X-RAY EXAM CHEST 1 VIEW: CPT | Mod: 26

## 2023-08-18 PROCEDURE — 93005 ELECTROCARDIOGRAM TRACING: CPT

## 2023-08-18 PROCEDURE — 71260 CT THORAX DX C+: CPT | Mod: MA

## 2023-08-18 PROCEDURE — 80053 COMPREHEN METABOLIC PANEL: CPT

## 2023-08-18 PROCEDURE — 99285 EMERGENCY DEPT VISIT HI MDM: CPT

## 2023-08-18 PROCEDURE — 74177 CT ABD & PELVIS W/CONTRAST: CPT | Mod: MA

## 2023-08-18 PROCEDURE — 85025 COMPLETE CBC W/AUTO DIFF WBC: CPT

## 2023-08-18 PROCEDURE — 36415 COLL VENOUS BLD VENIPUNCTURE: CPT

## 2023-08-18 PROCEDURE — 93010 ELECTROCARDIOGRAM REPORT: CPT

## 2023-08-18 PROCEDURE — 74177 CT ABD & PELVIS W/CONTRAST: CPT | Mod: 26,MA

## 2023-08-18 PROCEDURE — 71045 X-RAY EXAM CHEST 1 VIEW: CPT

## 2023-08-18 PROCEDURE — 84484 ASSAY OF TROPONIN QUANT: CPT

## 2023-08-18 PROCEDURE — 99285 EMERGENCY DEPT VISIT HI MDM: CPT | Mod: 25

## 2023-08-18 NOTE — ED PROVIDER NOTE - PATIENT PORTAL LINK FT
You can access the FollowMyHealth Patient Portal offered by [facility] by registering at the following website: [email or web address]. By joining [facility]’s FollowMyHealth portal, you will also be able to view your health information using other applications (apps) compatible with our system.

## 2023-08-18 NOTE — ED ADULT NURSE NOTE - NSFALLUNIVINTERV_ED_ALL_ED
Bed/Stretcher in lowest position, wheels locked, appropriate side rails in place/Call bell, personal items and telephone in reach/Instruct patient to call for assistance before getting out of bed/chair/stretcher/Non-slip footwear applied when patient is off stretcher/[place] to call system/Physically safe environment - no spills, clutter or unnecessary equipment/Purposeful proactive rounding/Room/bathroom lighting operational, light cord in reach

## 2023-08-18 NOTE — ED PROVIDER NOTE - PROGRESS NOTE DETAILS
PS: CT negative. Incidental finding of pulmonary nodules. Pt made aware. Pt to follow up with PCP PS: CT negative. Incidental finding of pulmonary nodules. Went to discuss results with pt but pt not there. RN calling pt

## 2023-08-18 NOTE — ED PROVIDER NOTE - ATTENDING CONTRIBUTION TO CARE
43-year-old male with past medical history of hypothyroidism, GERD, asthma, hyperlipidemia, ventral hernia repair by Dr. Gonzalez, presents with 2 years of intermittent chest pain.  Patient says is gotten worse in the last 3 months.  Patient says now radiates to his back.  Had a negative nuclear stress test with Dr. Sandy 2 years ago, and a negative stress test with Dr. Daley's a few months ago.  Patient was told his pain is likely not to be cardiac related.  He was told possible hiatal hernia.  Patient however says that he had endoscopy and colonoscopy 2 years ago and he was not told that he had a hiatal hernia.  Patient followed up with his endocrinologist Dr. Christie who suggested that his pain is actually coming from his thoracic spine.  Patient admits he has a history of lower back pain, chronic, followed up with Dr. Miller pain management physician in [facility] 2 years ago and received injections in the back.  He never had imaging of his upper back at that time.  Patient has no fever, chills, midline back pain, IV drug use, shortness of breath.  Patient admits he is abdomen is more distended last few months as well.  Denies vomiting, diarrhea, exam: nad, ncat, perrl, eomi, mmm, rrr, ctab, abd soft, nt, mildly distended, aox3, no calf tenderness, no pitting edema, no midline C/T/L-spine tenderness impression: Patient with chest pain worsening the last 3 months, radiating to the back, worse with movement.  Abdominal distention, has had negative cardiac evaluation outpatient.  We will check EKG, chest x-ray, labs, possible CT of abdomen and chest

## 2023-08-18 NOTE — ED PROVIDER NOTE - CARE PROVIDER_API CALL
Leigh Falk  [address] [address]  Phone: [phone]  Fax: [phone]  Follow Up Time: 1-3 Days

## 2023-08-18 NOTE — ED PROVIDER NOTE - CLINICAL SUMMARY MEDICAL DECISION MAKING FREE TEXT BOX
Patient with chest pain worsening the last 3 months, radiating to the back, worse with movement.  Abdominal distention, has had negative cardiac evaluation outpatient. Negative EKG and troponin ED.  Unlikely to be cardiac.  CT abdomen and chest, shows no abnormalities except for incidental findings of pulmonary nodules.  Patient appears to have eloped prior to discharge instructions and results being given to patient.

## 2023-08-18 NOTE — ED PROVIDER NOTE - PHYSICAL EXAMINATION
VITAL SIGNS: I have reviewed nursing notes and confirm.  CONSTITUTIONAL: well-appearing, non-toxic, NAD  SKIN: Warm dry, normal skin turgor  HEAD: NCAT  EYES: EOMI, PERRLA, no scleral icterus  ENT: Moist mucous membranes, normal pharynx with no erythema or exudates  NECK: Supple; non tender. Full ROM. No cervical LAD  CARD: RRR, no murmurs, rubs or gallops, left substernal chest wall tenderness  RESP: clear to ausculation b/l.  No rales, rhonchi, or wheezing.  ABD: soft, + BS, non-tender, distended, no rebound or guarding. No CVA tenderness  EXT: Full ROM, no bony tenderness, no pedal edema, no calf tenderness  NEURO: normal motor. normal sensory. CN II-XII intact. Cerebellar testing normal. Normal gait.  PSYCH: Cooperative, appropriate.

## 2023-08-18 NOTE — ED PROVIDER NOTE - OBJECTIVE STATEMENT
43-year-old male with past medical history of tobacco use, hyperlipidemia, asthma (follows with Dr. Farooq), hypothyroidism, GERD, history of ventral hernia repair with mesh (by Dr. Gonzalez) who presents for left substernal chest pain radiating to his back for the past 3 months.  Pain worse with movement.  No associated shortness of breath.  States he followed with cardiology, had a negative nuclear stress test a few months ago.  Was told this pain may be related to a new hernia.  Endoscopy done 2 years ago did not show any evidence of hernia.  Family history positive for bypass surgery and father at age 48.  Denies fevers, chills, shortness of breath, nausea, vomiting, diarrhea, abdominal pain, weakness, numbness, trauma.

## 2023-08-19 NOTE — ED POST DISCHARGE NOTE - NS ED POST DC CALL 1
Impression: Type 2 diabetes mellitus w/o complication: I02.2. OS. Condition: stable. Vision: vision not affected. Plan: Discussed diagnosis in detail with patient. Exam shows minimal Diabetic changes. No treatment is recommended at this time. Emphasized blood sugar control and advised to keep future appointments with PCP and/or Endocrinologist for the management of Diabetes. Recommend observation for now.  OCT shows drusen no active SRF/IRF OS Patient contacted

## 2023-10-04 ENCOUNTER — APPOINTMENT (OUTPATIENT)
Dept: PULMONOLOGY | Facility: CLINIC | Age: 43
End: 2023-10-04
Payer: COMMERCIAL

## 2023-10-04 VITALS
HEIGHT: 72 IN | WEIGHT: 240 LBS | DIASTOLIC BLOOD PRESSURE: 60 MMHG | BODY MASS INDEX: 32.51 KG/M2 | OXYGEN SATURATION: 96 % | RESPIRATION RATE: 12 BRPM | SYSTOLIC BLOOD PRESSURE: 120 MMHG | HEART RATE: 91 BPM

## 2023-10-04 PROCEDURE — 94010 BREATHING CAPACITY TEST: CPT

## 2023-10-04 PROCEDURE — 94729 DIFFUSING CAPACITY: CPT

## 2023-10-04 PROCEDURE — 94727 GAS DIL/WSHOT DETER LNG VOL: CPT

## 2023-10-04 PROCEDURE — 99214 OFFICE O/P EST MOD 30 MIN: CPT | Mod: 25

## 2023-11-18 ENCOUNTER — APPOINTMENT (OUTPATIENT)
Dept: SLEEP CENTER | Facility: HOSPITAL | Age: 43
End: 2023-11-18
Payer: COMMERCIAL

## 2023-11-18 ENCOUNTER — OUTPATIENT (OUTPATIENT)
Dept: OUTPATIENT SERVICES | Facility: HOSPITAL | Age: 43
LOS: 1 days | Discharge: HOME | End: 2023-11-18
Payer: COMMERCIAL

## 2023-11-18 DIAGNOSIS — G47.33 OBSTRUCTIVE SLEEP APNEA (ADULT) (PEDIATRIC): ICD-10-CM

## 2023-11-18 DIAGNOSIS — Z90.49 ACQUIRED ABSENCE OF OTHER SPECIFIED PARTS OF DIGESTIVE TRACT: Chronic | ICD-10-CM

## 2023-11-18 PROCEDURE — 95811 POLYSOM 6/>YRS CPAP 4/> PARM: CPT | Mod: 26

## 2023-11-18 PROCEDURE — 95811 POLYSOM 6/>YRS CPAP 4/> PARM: CPT

## 2023-11-21 DIAGNOSIS — G47.33 OBSTRUCTIVE SLEEP APNEA (ADULT) (PEDIATRIC): ICD-10-CM

## 2024-01-03 ENCOUNTER — APPOINTMENT (OUTPATIENT)
Dept: PULMONOLOGY | Facility: CLINIC | Age: 44
End: 2024-01-03
Payer: COMMERCIAL

## 2024-01-03 DIAGNOSIS — J45.909 UNSPECIFIED ASTHMA, UNCOMPLICATED: ICD-10-CM

## 2024-01-03 DIAGNOSIS — R91.8 OTHER NONSPECIFIC ABNORMAL FINDING OF LUNG FIELD: ICD-10-CM

## 2024-01-03 DIAGNOSIS — G47.33 OBSTRUCTIVE SLEEP APNEA (ADULT) (PEDIATRIC): ICD-10-CM

## 2024-01-03 PROCEDURE — 99214 OFFICE O/P EST MOD 30 MIN: CPT | Mod: 95

## 2024-01-03 NOTE — ASSESSMENT
[FreeTextEntry1] : Intermittent asthma well compensated Small lung nodules stable on repeat imaging for years. SP [facility] ED visit and CT chest. Nodules by report stable. Moderate DAIANA optimum CPAP 9 cm H2O. Not tolerating CPAP therapy SP split night.  Optimum BiPAP 10/6.  Awaiting BiPAP delivery  HO WTC exposure.  [employer] worker.

## 2024-01-03 NOTE — HISTORY OF PRESENT ILLNESS
[Intermittent] : intermittent [Doing Well] : doing well [Well Controlled] : Well controlled [Checks Regularly] : The patient checks ~his/her~ peak flow regularly [Good Control] : peak flow has been good [Adherent] : the patient is adherent with ~his/her~ medication regimen [Goals--Doing Well] : the patient is doing well with ~his/her~ asthma goals [PFTs] : pulmonary function tests [None] : No associated symptoms are reported [CPAP] : CPAP [Good Compliance] : good compliance with treatment [Fair Tolerance] : fair tolerance of treatment [Poor Tolerance] : poor tolerance of treatment [Fair Symptom Control] : fair symptom control [Follow-Up - Routine Clinic] : a routine clinic follow-up of [Home] : at home, [unfilled] , at the time of the visit. [Medical Office: ([facility])___] : at the medical office located in  [Verbal consent obtained from patient] : the patient, [unfilled]

## 2024-02-28 NOTE — ED ADULT NURSE NOTE - OBJECTIVE STATEMENT
2/28/2024    Blanca S Winchester    DIABETES HOME INSTRUCTIONS    Healthy Eating   Try and eat 3 meals a day. Consider a light breakfast if you are unable to eat a full breakfast.  Aim for 2 to 3 carbohydrate servings with each meal. The plate method is a helpful way of  counting carb's  Add a form of protein to meals and to snacks  Avoid sweetened liquids  Aim for 8 cups of water a day         Physical Activity    As tolerated     Taking Diabetes Medication   Toujeo 50 units   Novolog 14 units pre meals of not eating  16 units pre meals if eating plus a correction     Monitoring Blood Sugar   Check 4 times per day, before each meal and bedtime.    Your Blood Sugar Target is  Fasting and premeal:  mg/dL and 2 hours after a meal: less than 180 mg/dL     Call Your Doctor If Blood Sugar Is  Higher than 300 mg/dL or lower than 70 mg/dL for two tests in a row.           Miscellaneous:  Get a home sharps container.  Carry your Diabetes Identification card or jewelry with you at all times    Goal Planning:  The goal you selected is: To reduce my intake of soda to one a day                    Obtain yearly diabetes eye exam.            We Suggest Making An Appointment With Your    Doctor's Office (at least every 3-6 months, Eye Doctor (at least yearly), Dentist (at least every 6 months), Foot Doctor (as needed)    It is recommended that you get a flu vaccine every year and a pneumonia vaccine as indicated.     Bring your blood glucose meter, supplies, record book and written materials to your next education visit.    Lab Tests done today:  The diabetes-related test results were reviewed and discussed          LiveWell:  I highly recommend that you sign up for LiveWell if you don't already have this.  You will be able to handle scheduling and canceling [facility] appointments, review your lab work, review scheduled appointments, your medication list, and ask your provider and educator questions, without waiting on  the phone.  The  or I can help you do this.  Such a time saver!      Thank You, Yohana Hansen RN    c/o chest pain

## 2024-11-21 NOTE — ED ADULT NURSE NOTE - EENT WDL
Eyes with no visual disturbances.  Ears clean and dry and no hearing difficulties. Nose with pink mucosa and no drainage.  Mouth mucous membranes moist and pink.  No tenderness or swelling to throat or neck. Sski Pregnancy And Lactation Text: This medication is Pregnancy Category D and isn't considered safe during pregnancy. It is excreted in breast milk.

## 2024-12-06 NOTE — ED CDU PROVIDER INITIAL DAY NOTE - OBJECTIVE STATEMENT
39 y/o M, PMHx HLD, presents to the ED with complaints of chest discomfort x several months. Patient has been with mid-sternal chest pain x approx 6 mos however states that discomfort radiated and is now left sided for the past three days. He denies associated dyspnea, nausea, vomiting, abdominal pain, lower extremity swelling, fever, chills and recent illness. He is a smoker; admits to a FHx of CAD- Father had CABG at age 48. Patient follows with Dr. Sandy - states that he had a normal nuclear stress test in October 2020 and additionally had a stress echo two weeks ago (results not yet known per patient). 06-Dec-2024 02:32

## 2025-08-25 ENCOUNTER — APPOINTMENT (OUTPATIENT)
Dept: PULMONOLOGY | Facility: CLINIC | Age: 45
End: 2025-08-25
Payer: COMMERCIAL

## 2025-08-25 VITALS
HEART RATE: 95 BPM | BODY MASS INDEX: 25.77 KG/M2 | OXYGEN SATURATION: 97 % | SYSTOLIC BLOOD PRESSURE: 108 MMHG | WEIGHT: 190 LBS | DIASTOLIC BLOOD PRESSURE: 60 MMHG | RESPIRATION RATE: 14 BRPM

## 2025-08-25 DIAGNOSIS — J45.909 UNSPECIFIED ASTHMA, UNCOMPLICATED: ICD-10-CM

## 2025-08-25 DIAGNOSIS — G47.33 OBSTRUCTIVE SLEEP APNEA (ADULT) (PEDIATRIC): ICD-10-CM

## 2025-08-25 DIAGNOSIS — R91.8 OTHER NONSPECIFIC ABNORMAL FINDING OF LUNG FIELD: ICD-10-CM

## 2025-08-25 PROCEDURE — G2211 COMPLEX E/M VISIT ADD ON: CPT | Mod: NC

## 2025-08-25 PROCEDURE — 99214 OFFICE O/P EST MOD 30 MIN: CPT

## 2025-09-15 ENCOUNTER — APPOINTMENT (OUTPATIENT)
Dept: PULMONOLOGY | Facility: HOSPITAL | Age: 45
End: 2025-09-15